# Patient Record
Sex: MALE | Employment: OTHER | URBAN - METROPOLITAN AREA
[De-identification: names, ages, dates, MRNs, and addresses within clinical notes are randomized per-mention and may not be internally consistent; named-entity substitution may affect disease eponyms.]

---

## 2018-02-12 ENCOUNTER — OFFICE VISIT (OUTPATIENT)
Dept: FAMILY MEDICINE CLINIC | Age: 58
End: 2018-02-12

## 2018-02-12 VITALS
BODY MASS INDEX: 29.99 KG/M2 | HEIGHT: 65 IN | SYSTOLIC BLOOD PRESSURE: 184 MMHG | OXYGEN SATURATION: 97 % | HEART RATE: 65 BPM | WEIGHT: 180 LBS | DIASTOLIC BLOOD PRESSURE: 100 MMHG

## 2018-02-12 DIAGNOSIS — R73.03 PREDIABETES: ICD-10-CM

## 2018-02-12 DIAGNOSIS — M25.50 ARTHRALGIA, UNSPECIFIED JOINT: ICD-10-CM

## 2018-02-12 DIAGNOSIS — Z87.438: ICD-10-CM

## 2018-02-12 DIAGNOSIS — I10 ESSENTIAL HYPERTENSION: ICD-10-CM

## 2018-02-12 DIAGNOSIS — N41.0 ACUTE PROSTATITIS: ICD-10-CM

## 2018-02-12 DIAGNOSIS — Z00.00 ENCOUNTER FOR MEDICAL EXAMINATION TO ESTABLISH CARE: Primary | ICD-10-CM

## 2018-02-12 LAB
BILIRUBIN, POC: NORMAL
BLOOD URINE, POC: NORMAL
CLARITY, POC: CLEAR
COLOR, POC: YELLOW
GLUCOSE BLD-MCNC: 98 MG/DL
GLUCOSE URINE, POC: NORMAL
HBA1C MFR BLD: 6.2 %
KETONES, POC: NORMAL
LEUKOCYTE EST, POC: NORMAL
NITRITE, POC: NORMAL
PH, POC: 5.5
PROTEIN, POC: NORMAL
SPECIFIC GRAVITY, POC: 1.02
UROBILINOGEN, POC: 0.2

## 2018-02-12 PROCEDURE — 82962 GLUCOSE BLOOD TEST: CPT | Performed by: NURSE PRACTITIONER

## 2018-02-12 PROCEDURE — 99386 PREV VISIT NEW AGE 40-64: CPT | Performed by: NURSE PRACTITIONER

## 2018-02-12 PROCEDURE — 83036 HEMOGLOBIN GLYCOSYLATED A1C: CPT | Performed by: NURSE PRACTITIONER

## 2018-02-12 PROCEDURE — 93000 ELECTROCARDIOGRAM COMPLETE: CPT | Performed by: NURSE PRACTITIONER

## 2018-02-12 PROCEDURE — 81002 URINALYSIS NONAUTO W/O SCOPE: CPT | Performed by: NURSE PRACTITIONER

## 2018-02-12 RX ORDER — LOSARTAN POTASSIUM AND HYDROCHLOROTHIAZIDE 25; 100 MG/1; MG/1
1 TABLET ORAL DAILY
Qty: 30 TABLET | Refills: 3 | Status: SHIPPED | OUTPATIENT
Start: 2018-02-12 | End: 2018-05-17 | Stop reason: SDUPTHER

## 2018-02-12 RX ORDER — SULFAMETHOXAZOLE AND TRIMETHOPRIM 800; 160 MG/1; MG/1
1 TABLET ORAL 2 TIMES DAILY
Qty: 20 TABLET | Refills: 0 | Status: SHIPPED | OUTPATIENT
Start: 2018-02-12 | End: 2018-02-22

## 2018-02-12 RX ORDER — PREDNISONE 20 MG/1
40 TABLET ORAL DAILY
Qty: 10 TABLET | Refills: 0 | Status: SHIPPED | OUTPATIENT
Start: 2018-02-12 | End: 2018-02-17

## 2018-02-12 RX ORDER — INDOMETHACIN 50 MG/1
50 CAPSULE ORAL
Qty: 30 CAPSULE | Refills: 0 | Status: SHIPPED | OUTPATIENT
Start: 2018-02-12 | End: 2018-05-17 | Stop reason: SDUPTHER

## 2018-02-12 ASSESSMENT — ENCOUNTER SYMPTOMS
COLOR CHANGE: 0
EYE DISCHARGE: 0
SORE THROAT: 0
EYE REDNESS: 0
SHORTNESS OF BREATH: 0
ABDOMINAL DISTENTION: 0
COUGH: 0
ABDOMINAL PAIN: 0

## 2018-02-12 ASSESSMENT — PATIENT HEALTH QUESTIONNAIRE - PHQ9
SUM OF ALL RESPONSES TO PHQ QUESTIONS 1-9: 0
2. FEELING DOWN, DEPRESSED OR HOPELESS: 0
SUM OF ALL RESPONSES TO PHQ9 QUESTIONS 1 & 2: 0
1. LITTLE INTEREST OR PLEASURE IN DOING THINGS: 0

## 2018-02-12 NOTE — PROGRESS NOTES
[facility]      There is no immunization history on file for this patient. No Known Allergies  Outpatient Prescriptions Marked as Taking for the 2/12/18 encounter (Office Visit) with Jose Bauman CNP   Medication Sig Dispense Refill    losartan-hydrochlorothiazide (HYZAAR) 100-25 MG per tablet Take 1 tablet by mouth daily 30 tablet 3    indomethacin (INDOCIN) 50 MG capsule Take 1 capsule by mouth 3 times daily (with meals) 30 capsule 0    predniSONE (DELTASONE) 20 MG tablet Take 2 tablets by mouth daily for 5 days 10 tablet 0    sulfamethoxazole-trimethoprim (BACTRIM DS) 800-160 MG per tablet Take 1 tablet by mouth 2 times daily for 10 days 20 tablet 0       Past Medical History:   Diagnosis Date    Gout     Hypertension 02/12/2018     History reviewed. No pertinent surgical history. History reviewed. No pertinent family history. Social History     Social History    Marital status: Single     Spouse name: N/A    Number of children: N/A    Years of education: N/A     Occupational History    Not on file. Social History Main Topics    Smoking status: Never Smoker    Smokeless tobacco: Never Used    Alcohol use No    Drug use: No    Sexual activity: Not on file     Other Topics Concern    Not on file     Social History Narrative    No narrative on file       Review of Systems   Constitutional: Negative for fatigue, fever and unexpected weight change. HENT: Negative for congestion, ear pain and sore throat. Eyes: Negative for discharge, redness and visual disturbance. Respiratory: Negative for cough and shortness of breath. Cardiovascular: Negative for leg swelling. Gastrointestinal: Negative for abdominal distention and abdominal pain. Genitourinary: Negative for frequency and urgency. Scrotal mass   Musculoskeletal: Positive for arthralgias. Skin: Negative for color change. Neurological: Negative for facial asymmetry and headaches.    Psychiatric/Behavioral: Positive

## 2018-02-12 NOTE — PATIENT INSTRUCTIONS
april aspirina no es adecuado para todo el TRENGEREID, debido a que puede causar sangrado grave. · Visite a ford médico periódicamente. Usted podría necesitar consultar a ford médico con más frecuencia al principio o hasta que se reduzca ford presión arterial.  · Si usted está tomando medicamentos para la presión arterial, hable con ford médico antes de april medicamentos descongestionantes o antiinflamatorios, ramila ibuprofeno. Algunos de estos medicamentos pueden elevar la presión arterial.  · Aprenda a revisarse la presión arterial en ford hogar. Cambios en el estilo de alex  · Mantenga un peso saludable. [place] es particularmente importante si aumenta de peso en la komal de la cintura. Bajar solo 10 libras (4.5 kg) puede ayudarle a reducir ford presión arterial.  · Serge más ejercicios si ford médico se lo recomienda. Caminar es nils buena opción. Poco a poco, aumente la distancia que [employer]. Intente hacer por lo menos 30 minutos de ejercicio la mayoría de los días de la [place]. También podría nadar, montar en bicicleta o hacer otras actividades. · No tome alcohol o limite la cantidad que ariana. Hable con ford médico acerca de si puede april alcohol. · Trate de limitar la cantidad de sodio que consume a menos de 2,300 miligramos (mg) al día. [place] médico podría pedirle que trate de consumir menos de 1,500 mg al día. · Coma abundantes frutas (ramila bananas [plátanos] y naranjas), verduras, legumbres, granos integrales y productos lácteos de bajo contenido de [place]. · Reduzca la cantidad de grasas saturadas en ford dieta. Los productos derivados de los [place], ramila la [place], el queso y la carne, contienen grasas saturadas. El limitar estos alimentos podría ayudarle a bajar de peso y [place] reducir ford riesgo de nils enfermedad cardíaca. · No fume. El hábito de fumar aumenta ford riesgo de ataque cerebral y ataque al corazón.  Si necesita ayuda para dejar de fumar, hable con ford médico sobre programas y medicamentos para dejar de pregunte a ford profesional de nasrin. [facility] niega toda garantía o responsabilidad por ford uso de esta información. Patient Education        Dieta DASH: Oziel Holes - [ DASH Diet: Care Instructions ]  Instrucciones de cuidado    La dieta DASH es un plan de alimentación que puede ayudar a bajar la presión arterial. DASH es la sigla en inglés de \"Dietary Approaches to Stop Hypertension\" (medidas dietéticas para disminuir la hipertensión). Hipertensión significa presión arterial vu. La dieta DASH se concentra en el consumo de alimentos con alto contenido de calcio, potasio y [place]. Estos nutrientes pueden disminuir la presión arterial. Los alimentos con el mayor contenido de [place] nutrientes son las frutas, las verduras, los productos lácteos de bajo contenido de [place], las nueces, las semillas y las legumbres. [place] april suplementos de calcio, potasio y magnesio, en lugar de comer alimentos ricos en estos nutrientes, no tiene el mismo efecto. La dieta DASH también incluye granos integrales, pescado y aves. La dieta DASH es julia de los cambios de estilo de alex que quizá le recomiende ford médico para reducir la presión arterial vu. Es posible que ford médico también quiera que usted reduzca la cantidad de sodio en ford Laurita Katina. Reducir el sodio mientras sigue la dieta DASH puede bajar la presión arterial incluso más que únicamente con la dieta DASH. La atención de seguimiento es nils parte clave de ford tratamiento y seguridad. Asegúrese de hacer y acudir a todas las citas, y llame a ford médico si está teniendo problemas. También es nils buena idea saber los resultados de artis exámenes y mantener nils lista de los medicamentos que ting. ¿Cómo puede cuidarse en el hogar? Cómo seguir la dieta DASH  · Coma entre 4 y 5 porciones de fruta al día.  Nils porción incluye 1 fruta de [place], ½ taza de fruta enlatada o cortada en trozos, 1/4 taza de fruta seca o 4 onzas (½ taza) de [place] de

## 2018-02-13 PROBLEM — M25.50 ARTHRALGIA: Status: ACTIVE | Noted: 2018-02-13

## 2018-02-13 PROBLEM — N41.0 ACUTE PROSTATITIS: Status: ACTIVE | Noted: 2018-02-13

## 2018-02-13 PROBLEM — Z87.438: Status: ACTIVE | Noted: 2018-02-13

## 2018-02-13 PROBLEM — R73.03 PREDIABETES: Status: ACTIVE | Noted: 2018-02-13

## 2018-02-13 PROBLEM — I10 ESSENTIAL HYPERTENSION: Status: ACTIVE | Noted: 2018-02-13

## 2018-02-14 LAB — URINE CULTURE, ROUTINE: NORMAL

## 2018-02-23 DIAGNOSIS — M25.50 ARTHRALGIA, UNSPECIFIED JOINT: ICD-10-CM

## 2018-02-23 DIAGNOSIS — Z00.00 ENCOUNTER FOR MEDICAL EXAMINATION TO ESTABLISH CARE: ICD-10-CM

## 2018-02-23 LAB
A/G RATIO: 1.8 (ref 1.1–2.2)
ALBUMIN SERPL-MCNC: 5 G/DL (ref 3.4–5)
ALP BLD-CCNC: 206 U/L (ref 40–129)
ALT SERPL-CCNC: 48 U/L (ref 10–40)
ANION GAP SERPL CALCULATED.3IONS-SCNC: 16 MMOL/L (ref 3–16)
AST SERPL-CCNC: 23 U/L (ref 15–37)
BASOPHILS ABSOLUTE: 0.1 K/UL (ref 0–0.2)
BASOPHILS RELATIVE PERCENT: 0.3 %
BILIRUB SERPL-MCNC: 0.6 MG/DL (ref 0–1)
BILIRUBIN DIRECT: <0.2 MG/DL (ref 0–0.3)
BILIRUBIN, INDIRECT: ABNORMAL MG/DL (ref 0–1)
BUN BLDV-MCNC: 23 MG/DL (ref 7–20)
CALCIUM SERPL-MCNC: 9.5 MG/DL (ref 8.3–10.6)
CHLORIDE BLD-SCNC: 98 MMOL/L (ref 99–110)
CHOLESTEROL, TOTAL: 329 MG/DL (ref 0–199)
CO2: 23 MMOL/L (ref 21–32)
CREAT SERPL-MCNC: 1.2 MG/DL (ref 0.9–1.3)
EOSINOPHILS ABSOLUTE: 0 K/UL (ref 0–0.6)
EOSINOPHILS RELATIVE PERCENT: 0.2 %
GFR AFRICAN AMERICAN: >60
GFR NON-AFRICAN AMERICAN: >60
GLOBULIN: 2.8 G/DL
GLUCOSE BLD-MCNC: 101 MG/DL (ref 70–99)
HCT VFR BLD CALC: 49 % (ref 40.5–52.5)
HDLC SERPL-MCNC: 57 MG/DL (ref 40–60)
HEMOGLOBIN: 16.3 G/DL (ref 13.5–17.5)
LDL CHOLESTEROL CALCULATED: 229 MG/DL
LIPASE: 25 U/L (ref 13–60)
LYMPHOCYTES ABSOLUTE: 3.4 K/UL (ref 1–5.1)
LYMPHOCYTES RELATIVE PERCENT: 21.5 %
MCH RBC QN AUTO: 31 PG (ref 26–34)
MCHC RBC AUTO-ENTMCNC: 33.4 G/DL (ref 31–36)
MCV RBC AUTO: 93 FL (ref 80–100)
MONOCYTES ABSOLUTE: 0.7 K/UL (ref 0–1.3)
MONOCYTES RELATIVE PERCENT: 4.8 %
NEUTROPHILS ABSOLUTE: 11.5 K/UL (ref 1.7–7.7)
NEUTROPHILS RELATIVE PERCENT: 73.2 %
PDW BLD-RTO: 13.7 % (ref 12.4–15.4)
PLATELET # BLD: 305 K/UL (ref 135–450)
PMV BLD AUTO: 10.5 FL (ref 5–10.5)
POTASSIUM SERPL-SCNC: 4.8 MMOL/L (ref 3.5–5.1)
PROSTATE SPECIFIC ANTIGEN: 0.54 NG/ML (ref 0–4)
RBC # BLD: 5.27 M/UL (ref 4.2–5.9)
RHEUMATOID FACTOR: <10 IU/ML
SEDIMENTATION RATE, ERYTHROCYTE: 9 MM/HR (ref 0–20)
SODIUM BLD-SCNC: 137 MMOL/L (ref 136–145)
TOTAL PROTEIN: 7.8 G/DL (ref 6.4–8.2)
TRIGL SERPL-MCNC: 215 MG/DL (ref 0–150)
TSH REFLEX: 1.47 UIU/ML (ref 0.27–4.2)
VLDLC SERPL CALC-MCNC: 43 MG/DL
WBC # BLD: 15.7 K/UL (ref 4–11)

## 2018-02-26 ENCOUNTER — OFFICE VISIT (OUTPATIENT)
Dept: FAMILY MEDICINE CLINIC | Age: 58
End: 2018-02-26

## 2018-02-26 VITALS
SYSTOLIC BLOOD PRESSURE: 152 MMHG | DIASTOLIC BLOOD PRESSURE: 94 MMHG | RESPIRATION RATE: 16 BRPM | HEART RATE: 67 BPM | BODY MASS INDEX: 30.66 KG/M2 | OXYGEN SATURATION: 97 % | WEIGHT: 184 LBS | HEIGHT: 65 IN

## 2018-02-26 DIAGNOSIS — E78.49 OTHER HYPERLIPIDEMIA: ICD-10-CM

## 2018-02-26 DIAGNOSIS — I10 ESSENTIAL HYPERTENSION: Primary | ICD-10-CM

## 2018-02-26 PROCEDURE — G8417 CALC BMI ABV UP PARAM F/U: HCPCS | Performed by: NURSE PRACTITIONER

## 2018-02-26 PROCEDURE — G8427 DOCREV CUR MEDS BY ELIG CLIN: HCPCS | Performed by: NURSE PRACTITIONER

## 2018-02-26 PROCEDURE — 3017F COLORECTAL CA SCREEN DOC REV: CPT | Performed by: NURSE PRACTITIONER

## 2018-02-26 PROCEDURE — G8484 FLU IMMUNIZE NO ADMIN: HCPCS | Performed by: NURSE PRACTITIONER

## 2018-02-26 PROCEDURE — 1036F TOBACCO NON-USER: CPT | Performed by: NURSE PRACTITIONER

## 2018-02-26 PROCEDURE — 99213 OFFICE O/P EST LOW 20 MIN: CPT | Performed by: NURSE PRACTITIONER

## 2018-02-26 RX ORDER — LISINOPRIL 10 MG/1
10 TABLET ORAL DAILY
Qty: 30 TABLET | Refills: 0 | Status: SHIPPED | OUTPATIENT
Start: 2018-02-26 | End: 2018-05-17 | Stop reason: SDUPTHER

## 2018-02-26 RX ORDER — CHLORAL HYDRATE 500 MG
1 CAPSULE ORAL DAILY
Qty: 90 CAPSULE | Refills: 2 | Status: SHIPPED | OUTPATIENT
Start: 2018-02-26 | End: 2019-02-26 | Stop reason: SDUPTHER

## 2018-02-26 NOTE — PATIENT INSTRUCTIONS
saludables, no fumar, adelgazar y [address] · Es posible que tenga que april medicamentos. La atención de seguimiento es nils parte clave de ford tratamiento y seguridad. Asegúrese de hacer y acudir a todas las citas, y llame a ford médico si está teniendo problemas. También es nils buena idea saber los resultados de artis exámenes y mantener nils lista de los medicamentos que tign. ¿Dónde puede encontrar más información en inglés? Haile Rivas a [email or web address] e ingrese a ford cuenta de MyChart. Emily Boys H554 en el cuadro \"Search Health Information\" para más información (en inglés) sobre \"Aprenda sobre el colesterol alto - [ Learning About High Cholesterol ]. \"     Si no tiene nils cuenta, lisbet maribeth en el enlace \"Sign Up Now\". Revisado: 21 septiembre, 2016  Versión del contenido: 11.5  © 1538-1519 Healthwise, Incorporated. Las instrucciones de cuidado fueron adaptadas bajo licencia por [facility] ([facility]). Si usted tiene [place] afección médica o sobre estas instrucciones, siempre pregunte a ford profesional de nasrin. Healthwise, Incorporated niega toda garantía o responsabilidad por ford uso de esta información. Patient Education        Prediabetes: Instrucciones de cuidado - [ Prediabetes: Care Instructions ]  Instrucciones de cuidado  La prediabetes es nils señal de advertencia de que usted está en riesgo de llegar a tener diabetes tipo 2. [place] significa que ford nivel de azúcar en la vikram es más alto de lo que debiera ser. Los alimentos que usted come se convierten en azúcar, la cual ford cuerpo [place] para obtener energía. Normalmente, un órgano llamado páncreas produce insulina, la cual permite que el azúcar en la vikram llegue a las células del cuerpo. [place] cuando el cuerpo no puede utilizar la [employer], el azúcar no entra en las células. En cambio, se queda en [place] [employer]. [place] se conoce ramila resistencia a la insulina.  La acumulación de azúcar en la vikram causa

## 2018-02-26 NOTE — PROGRESS NOTES
History   Smoking Status    Never Smoker   Smokeless Tobacco    Never Used     Jb Grass 63 y.o. male,    PMH, surgeries, SH, FH, allergies reviewed. Medication list reviewed and updated. Chief Complaint   Patient presents with    Discuss Labs    Hyperlipidemia    Hypertension     Hypertension: Patient here for follow-up of elevated blood pressure. He is exercising and is adherent to low salt diet. Blood pressure is not well controlled at home. Cardiac symptoms none. Patient denies chest pain, chest pressure/discomfort, claudication, syncope and tachypnea. Cardiovascular risk factors: advanced age (older than 54 for men, 72 for women), diabetes mellitus, dyslipidemia, hypertension and male gender. Use of agents associated with hypertension: NSAIDS. History of target organ damage: none. Objective:   VS reviewed    Vitals:    02/26/18 1313 02/26/18 1322   BP: (!) 158/98 (!) 152/94   Site: Left Arm    Position: Sitting    Cuff Size: Medium Adult    Pulse: 67    Resp: 16    SpO2: 97%    Weight: 184 lb (83.5 kg)    Height: 5' 5\" (1.651 m)      Body mass index is 30.62 kg/m². Wt Readings from Last 3 Encounters:   02/26/18 184 lb (83.5 kg)   02/12/18 180 lb (81.6 kg)   10/10/17 180 lb (81.6 kg)     BP Readings from Last 3 Encounters:   02/26/18 (!) 152/94   02/12/18 (!) 184/100   10/10/17 (!) 158/102       Physical Exam   Constitutional: He is oriented to person, place, and time and well-developed, well-nourished, and in no distress. HENT:   Head: Normocephalic and atraumatic. Eyes: Pupils are equal, round, and reactive to light. Neck: Normal range of motion. Neck supple. Cardiovascular: Normal rate. Pulmonary/Chest: Breath sounds normal. No respiratory distress. Abdominal: Soft. He exhibits no distension. There is no tenderness. Genitourinary: Penis normal. No discharge found. Musculoskeletal: Normal range of motion. He exhibits no edema or tenderness.    Neurological: He is alert and

## 2018-02-27 PROBLEM — E78.49 OTHER HYPERLIPIDEMIA: Status: ACTIVE | Noted: 2018-02-27

## 2018-03-16 ENCOUNTER — OFFICE VISIT (OUTPATIENT)
Dept: FAMILY MEDICINE CLINIC | Age: 58
End: 2018-03-16

## 2018-03-16 DIAGNOSIS — I10 ESSENTIAL HYPERTENSION: Primary | ICD-10-CM

## 2018-05-17 ENCOUNTER — OFFICE VISIT (OUTPATIENT)
Dept: FAMILY MEDICINE CLINIC | Age: 58
End: 2018-05-17

## 2018-05-17 VITALS
OXYGEN SATURATION: 99 % | WEIGHT: 186 LBS | DIASTOLIC BLOOD PRESSURE: 90 MMHG | SYSTOLIC BLOOD PRESSURE: 140 MMHG | HEART RATE: 60 BPM | BODY MASS INDEX: 30.95 KG/M2 | TEMPERATURE: 98 F

## 2018-05-17 DIAGNOSIS — M25.50 ARTHRALGIA, UNSPECIFIED JOINT: ICD-10-CM

## 2018-05-17 DIAGNOSIS — I10 ESSENTIAL HYPERTENSION: ICD-10-CM

## 2018-05-17 DIAGNOSIS — M10.9 GOUT, ARTHRITIS: Primary | ICD-10-CM

## 2018-05-17 DIAGNOSIS — R73.03 PREDIABETES: ICD-10-CM

## 2018-05-17 LAB
A/G RATIO: 1.6 (ref 1.1–2.2)
ALBUMIN SERPL-MCNC: 4.7 G/DL (ref 3.4–5)
ALP BLD-CCNC: 166 U/L (ref 40–129)
ALT SERPL-CCNC: 62 U/L (ref 10–40)
ANION GAP SERPL CALCULATED.3IONS-SCNC: 21 MMOL/L (ref 3–16)
AST SERPL-CCNC: 34 U/L (ref 15–37)
BILIRUB SERPL-MCNC: 0.4 MG/DL (ref 0–1)
BUN BLDV-MCNC: 15 MG/DL (ref 7–20)
CALCIUM SERPL-MCNC: 9.3 MG/DL (ref 8.3–10.6)
CHLORIDE BLD-SCNC: 103 MMOL/L (ref 99–110)
CO2: 20 MMOL/L (ref 21–32)
CREAT SERPL-MCNC: 0.9 MG/DL (ref 0.9–1.3)
CREATININE URINE POCT: NORMAL
GFR AFRICAN AMERICAN: >60
GFR NON-AFRICAN AMERICAN: >60
GLOBULIN: 3 G/DL
GLUCOSE BLD-MCNC: 78 MG/DL (ref 70–99)
HBA1C MFR BLD: 6.1 %
MICROALBUMIN/CREAT 24H UR: NORMAL MG/G{CREAT}
MICROALBUMIN/CREAT UR-RTO: NORMAL
POTASSIUM SERPL-SCNC: 4.3 MMOL/L (ref 3.5–5.1)
SODIUM BLD-SCNC: 144 MMOL/L (ref 136–145)
TOTAL PROTEIN: 7.7 G/DL (ref 6.4–8.2)
URIC ACID, SERUM: 8.9 MG/DL (ref 3.5–7.2)

## 2018-05-17 PROCEDURE — 3017F COLORECTAL CA SCREEN DOC REV: CPT | Performed by: NURSE PRACTITIONER

## 2018-05-17 PROCEDURE — G8427 DOCREV CUR MEDS BY ELIG CLIN: HCPCS | Performed by: NURSE PRACTITIONER

## 2018-05-17 PROCEDURE — 83036 HEMOGLOBIN GLYCOSYLATED A1C: CPT | Performed by: NURSE PRACTITIONER

## 2018-05-17 PROCEDURE — 82044 UR ALBUMIN SEMIQUANTITATIVE: CPT | Performed by: NURSE PRACTITIONER

## 2018-05-17 PROCEDURE — G8417 CALC BMI ABV UP PARAM F/U: HCPCS | Performed by: NURSE PRACTITIONER

## 2018-05-17 PROCEDURE — 36415 COLL VENOUS BLD VENIPUNCTURE: CPT | Performed by: NURSE PRACTITIONER

## 2018-05-17 PROCEDURE — 99214 OFFICE O/P EST MOD 30 MIN: CPT | Performed by: NURSE PRACTITIONER

## 2018-05-17 PROCEDURE — 1036F TOBACCO NON-USER: CPT | Performed by: NURSE PRACTITIONER

## 2018-05-17 RX ORDER — INDOMETHACIN 50 MG/1
50 CAPSULE ORAL
Qty: 30 CAPSULE | Refills: 0 | Status: SHIPPED | OUTPATIENT
Start: 2018-05-17 | End: 2018-07-26 | Stop reason: SDUPTHER

## 2018-05-17 RX ORDER — PREDNISONE 20 MG/1
40 TABLET ORAL DAILY
Qty: 14 TABLET | Refills: 0 | Status: SHIPPED | OUTPATIENT
Start: 2018-05-17 | End: 2018-05-24

## 2018-05-17 RX ORDER — LOSARTAN POTASSIUM AND HYDROCHLOROTHIAZIDE 25; 100 MG/1; MG/1
1 TABLET ORAL DAILY
Qty: 30 TABLET | Refills: 3 | Status: SHIPPED | OUTPATIENT
Start: 2018-05-17 | End: 2018-07-26 | Stop reason: SINTOL

## 2018-05-17 RX ORDER — LISINOPRIL 10 MG/1
10 TABLET ORAL DAILY
Qty: 30 TABLET | Refills: 3 | Status: SHIPPED | OUTPATIENT
Start: 2018-05-17 | End: 2018-07-26 | Stop reason: DRUGHIGH

## 2018-05-29 ASSESSMENT — ENCOUNTER SYMPTOMS
SORE THROAT: 0
COUGH: 0

## 2018-07-26 ENCOUNTER — OFFICE VISIT (OUTPATIENT)
Dept: FAMILY MEDICINE CLINIC | Age: 58
End: 2018-07-26

## 2018-07-26 VITALS
BODY MASS INDEX: 29.99 KG/M2 | SYSTOLIC BLOOD PRESSURE: 142 MMHG | WEIGHT: 180.2 LBS | HEART RATE: 58 BPM | OXYGEN SATURATION: 98 % | TEMPERATURE: 98 F | DIASTOLIC BLOOD PRESSURE: 92 MMHG

## 2018-07-26 DIAGNOSIS — M10.9 GOUT, ARTHRITIS: ICD-10-CM

## 2018-07-26 DIAGNOSIS — I10 ESSENTIAL HYPERTENSION: ICD-10-CM

## 2018-07-26 PROCEDURE — G8417 CALC BMI ABV UP PARAM F/U: HCPCS | Performed by: NURSE PRACTITIONER

## 2018-07-26 PROCEDURE — 3017F COLORECTAL CA SCREEN DOC REV: CPT | Performed by: NURSE PRACTITIONER

## 2018-07-26 PROCEDURE — G8427 DOCREV CUR MEDS BY ELIG CLIN: HCPCS | Performed by: NURSE PRACTITIONER

## 2018-07-26 PROCEDURE — 99214 OFFICE O/P EST MOD 30 MIN: CPT | Performed by: NURSE PRACTITIONER

## 2018-07-26 PROCEDURE — 1036F TOBACCO NON-USER: CPT | Performed by: NURSE PRACTITIONER

## 2018-07-26 RX ORDER — LISINOPRIL 20 MG/1
20 TABLET ORAL DAILY
Qty: 30 TABLET | Refills: 2 | Status: SHIPPED | OUTPATIENT
Start: 2018-07-26 | End: 2019-02-26 | Stop reason: SDUPTHER

## 2018-07-26 RX ORDER — INDOMETHACIN 50 MG/1
50 CAPSULE ORAL
Qty: 90 CAPSULE | Refills: 1 | Status: SHIPPED | OUTPATIENT
Start: 2018-07-26 | End: 2020-04-01

## 2018-07-26 RX ORDER — ALLOPURINOL 100 MG/1
200 TABLET ORAL DAILY
Qty: 30 TABLET | Refills: 3 | Status: SHIPPED | OUTPATIENT
Start: 2018-07-26 | End: 2019-02-26 | Stop reason: SDUPTHER

## 2018-07-26 NOTE — PROGRESS NOTES
History   Smoking Status    Never Smoker   Smokeless Tobacco    Never Used     Wyvonnia Holiday 63 y.o. male,      Chief Complaint   Patient presents with    Gout     follow up        Hypertension: Patient here for follow-up of elevated blood pressure. He is exercising and is adherent to low salt diet. Blood pressure is not well controlled at home. Cardiac symptoms none. Patient denies chest pain, chest pressure/discomfort, claudication, dyspnea, orthopnea, palpitations, syncope and tachypnea. Cardiovascular risk factors: advanced age (older than 54 for men, 72 for women), diabetes mellitus, hypertension and male gender. Use of agents associated with hypertension: none. History of target organ damage: none. PMH, SH, FH, allergies reviewed and updated. Medication list reviewed and updated. No Known Allergies      Vitals:    07/26/18 1327   BP: (!) 142/92   Site: Left Arm   Position: Sitting   Cuff Size: Medium Adult   Pulse: 58   Temp: 98 °F (36.7 °C)   TempSrc: Oral   SpO2: 98%   Weight: 180 lb 3.2 oz (81.7 kg)     Body mass index is 29.99 kg/m². Physical Exam   Constitutional: He is oriented to person, place, and time and well-developed, well-nourished, and in no distress. HENT:   Head: Normocephalic. Eyes: Pupils are equal, round, and reactive to light. Neck: Normal range of motion. Neck supple. Cardiovascular: Normal rate and regular rhythm. Pulmonary/Chest: Effort normal and breath sounds normal.   Abdominal: Soft. Bowel sounds are normal.   Musculoskeletal: Normal range of motion. Neurological: He is alert and oriented to person, place, and time. Skin: Skin is warm and dry. ASSESSMENT/PLAN  Stop taking Losartan-Hydrochlorthiazide  Start taking 20mg daily of Lisinopril  Start taking Allopurinol daily to prevent gout attacks  Take Indomethacin as needed for pain     1. Essential hypertension    2.  Gout, arthritis        Essential hypertension  -     lisinopril (PRINIVIL;ZESTRIL) 20 MG tablet; Take 1 tablet by mouth daily  -     allopurinol (ZYLOPRIM) 100 MG tablet; Take 2 tablets by mouth daily    Gout, arthritis  -     indomethacin (INDOCIN) 50 MG capsule; Take 1 capsule by mouth 3 times daily (with meals)      Return in about 2 months (around 9/26/2018) for Gout w/  . No orders of the defined types were placed in this encounter. Current Outpatient Prescriptions   Medication Sig Dispense Refill    lisinopril (PRINIVIL;ZESTRIL) 20 MG tablet Take 1 tablet by mouth daily 30 tablet 2    allopurinol (ZYLOPRIM) 100 MG tablet Take 2 tablets by mouth daily 30 tablet 3    indomethacin (INDOCIN) 50 MG capsule Take 1 capsule by mouth 3 times daily (with meals) 90 capsule 1    Omega-3 Fatty Acids (OMEGA-3 FISH OIL) 1000 MG CAPS Take 1 capsule by mouth daily 90 capsule 2    colchicine (COLCRYS) 0.6 MG tablet One tablet per hour or every two hours until relief of gouty pain and inflammation, up to a total of 4 mg or until upset stomach occurs 30 tablet 0     No current facility-administered medications for this visit.

## 2019-02-26 ENCOUNTER — OFFICE VISIT (OUTPATIENT)
Dept: FAMILY MEDICINE CLINIC | Age: 59
End: 2019-02-26

## 2019-02-26 VITALS
HEART RATE: 52 BPM | OXYGEN SATURATION: 98 % | BODY MASS INDEX: 31.71 KG/M2 | RESPIRATION RATE: 16 BRPM | SYSTOLIC BLOOD PRESSURE: 142 MMHG | TEMPERATURE: 98.2 F | DIASTOLIC BLOOD PRESSURE: 98 MMHG | HEIGHT: 63 IN | WEIGHT: 179 LBS

## 2019-02-26 DIAGNOSIS — E78.49 OTHER HYPERLIPIDEMIA: ICD-10-CM

## 2019-02-26 DIAGNOSIS — I10 ESSENTIAL HYPERTENSION: ICD-10-CM

## 2019-02-26 DIAGNOSIS — M25.511 CHRONIC RIGHT SHOULDER PAIN: Primary | ICD-10-CM

## 2019-02-26 DIAGNOSIS — M10.9 GOUT, ARTHRITIS: ICD-10-CM

## 2019-02-26 DIAGNOSIS — R73.03 PREDIABETES: ICD-10-CM

## 2019-02-26 DIAGNOSIS — G89.29 CHRONIC RIGHT SHOULDER PAIN: Primary | ICD-10-CM

## 2019-02-26 DIAGNOSIS — Z12.5 PROSTATE CANCER SCREENING: ICD-10-CM

## 2019-02-26 DIAGNOSIS — N50.89 TESTICLE LUMP: ICD-10-CM

## 2019-02-26 LAB
A/G RATIO: 1.6 (ref 1.1–2.2)
ALBUMIN SERPL-MCNC: 4.7 G/DL (ref 3.4–5)
ALP BLD-CCNC: 171 U/L (ref 40–129)
ALT SERPL-CCNC: 50 U/L (ref 10–40)
ANION GAP SERPL CALCULATED.3IONS-SCNC: 17 MMOL/L (ref 3–16)
AST SERPL-CCNC: 26 U/L (ref 15–37)
BASOPHILS ABSOLUTE: 0.1 K/UL (ref 0–0.2)
BASOPHILS RELATIVE PERCENT: 0.5 %
BILIRUB SERPL-MCNC: <0.2 MG/DL (ref 0–1)
BUN BLDV-MCNC: 27 MG/DL (ref 7–20)
CALCIUM SERPL-MCNC: 10.3 MG/DL (ref 8.3–10.6)
CHLORIDE BLD-SCNC: 101 MMOL/L (ref 99–110)
CHOLESTEROL, TOTAL: 308 MG/DL (ref 0–199)
CO2: 24 MMOL/L (ref 21–32)
CREAT SERPL-MCNC: 1.1 MG/DL (ref 0.9–1.3)
EOSINOPHILS ABSOLUTE: 0.1 K/UL (ref 0–0.6)
EOSINOPHILS RELATIVE PERCENT: 0.8 %
GFR AFRICAN AMERICAN: >60
GFR NON-AFRICAN AMERICAN: >60
GLOBULIN: 3 G/DL
GLUCOSE BLD-MCNC: 100 MG/DL (ref 70–99)
HCT VFR BLD CALC: 48.1 % (ref 40.5–52.5)
HDLC SERPL-MCNC: 47 MG/DL (ref 40–60)
HEMOGLOBIN: 15.6 G/DL (ref 13.5–17.5)
LDL CHOLESTEROL CALCULATED: 202 MG/DL
LYMPHOCYTES ABSOLUTE: 4.1 K/UL (ref 1–5.1)
LYMPHOCYTES RELATIVE PERCENT: 33.8 %
MCH RBC QN AUTO: 31 PG (ref 26–34)
MCHC RBC AUTO-ENTMCNC: 32.4 G/DL (ref 31–36)
MCV RBC AUTO: 95.7 FL (ref 80–100)
MONOCYTES ABSOLUTE: 1 K/UL (ref 0–1.3)
MONOCYTES RELATIVE PERCENT: 8.2 %
NEUTROPHILS ABSOLUTE: 6.8 K/UL (ref 1.7–7.7)
NEUTROPHILS RELATIVE PERCENT: 56.7 %
PDW BLD-RTO: 13.7 % (ref 12.4–15.4)
PLATELET # BLD: 273 K/UL (ref 135–450)
PMV BLD AUTO: 10.9 FL (ref 5–10.5)
POTASSIUM SERPL-SCNC: 4.3 MMOL/L (ref 3.5–5.1)
PROSTATE SPECIFIC ANTIGEN: 0.9 NG/ML (ref 0–4)
RBC # BLD: 5.03 M/UL (ref 4.2–5.9)
SODIUM BLD-SCNC: 142 MMOL/L (ref 136–145)
TOTAL PROTEIN: 7.7 G/DL (ref 6.4–8.2)
TRIGL SERPL-MCNC: 296 MG/DL (ref 0–150)
TSH REFLEX: 2.76 UIU/ML (ref 0.27–4.2)
URIC ACID, SERUM: 9.1 MG/DL (ref 3.5–7.2)
VLDLC SERPL CALC-MCNC: 59 MG/DL
WBC # BLD: 12 K/UL (ref 4–11)

## 2019-02-26 PROCEDURE — 99214 OFFICE O/P EST MOD 30 MIN: CPT | Performed by: NURSE PRACTITIONER

## 2019-02-26 PROCEDURE — 36415 COLL VENOUS BLD VENIPUNCTURE: CPT | Performed by: NURSE PRACTITIONER

## 2019-02-26 RX ORDER — MELOXICAM 7.5 MG/1
7.5 TABLET ORAL DAILY
Qty: 60 TABLET | Refills: 1 | Status: SHIPPED | OUTPATIENT
Start: 2019-02-26 | End: 2020-04-01

## 2019-02-26 RX ORDER — LISINOPRIL 20 MG/1
20 TABLET ORAL DAILY
Qty: 60 TABLET | Refills: 1 | Status: ON HOLD | OUTPATIENT
Start: 2019-02-26 | End: 2021-02-12 | Stop reason: SDUPTHER

## 2019-02-26 RX ORDER — CHLORAL HYDRATE 500 MG
1 CAPSULE ORAL DAILY
Qty: 90 CAPSULE | Refills: 2 | Status: SHIPPED | OUTPATIENT
Start: 2019-02-26

## 2019-02-26 RX ORDER — ALLOPURINOL 100 MG/1
200 TABLET ORAL DAILY
Qty: 30 TABLET | Refills: 3 | Status: SHIPPED | OUTPATIENT
Start: 2019-02-26

## 2019-02-26 RX ORDER — SULFAMETHOXAZOLE AND TRIMETHOPRIM 800; 160 MG/1; MG/1
1 TABLET ORAL 2 TIMES DAILY
Qty: 20 TABLET | Refills: 0 | Status: SHIPPED | OUTPATIENT
Start: 2019-02-26 | End: 2019-03-08

## 2019-02-27 LAB
ESTIMATED AVERAGE GLUCOSE: 139.9 MG/DL
HBA1C MFR BLD: 6.5 %

## 2019-03-11 ENCOUNTER — TELEPHONE (OUTPATIENT)
Dept: FAMILY MEDICINE CLINIC | Age: 59
End: 2019-03-11

## 2019-03-12 DIAGNOSIS — E78.49 OTHER HYPERLIPIDEMIA: Primary | ICD-10-CM

## 2019-03-12 DIAGNOSIS — R73.03 PREDIABETES: ICD-10-CM

## 2019-03-12 RX ORDER — ATORVASTATIN CALCIUM 20 MG/1
20 TABLET, FILM COATED ORAL DAILY
Qty: 30 TABLET | Refills: 2 | Status: SHIPPED | OUTPATIENT
Start: 2019-03-12

## 2019-03-14 ENCOUNTER — OFFICE VISIT (OUTPATIENT)
Dept: FAMILY MEDICINE CLINIC | Age: 59
End: 2019-03-14

## 2019-03-14 VITALS
HEART RATE: 63 BPM | OXYGEN SATURATION: 97 % | SYSTOLIC BLOOD PRESSURE: 100 MMHG | WEIGHT: 163.4 LBS | DIASTOLIC BLOOD PRESSURE: 72 MMHG | RESPIRATION RATE: 15 BRPM | BODY MASS INDEX: 28.95 KG/M2 | TEMPERATURE: 97.8 F

## 2019-03-14 DIAGNOSIS — N34.2 URETHRITIS: Primary | ICD-10-CM

## 2019-03-14 DIAGNOSIS — E11.9 NEWLY DIAGNOSED DIABETES (HCC): ICD-10-CM

## 2019-03-14 LAB
BILIRUBIN, POC: NORMAL
BLOOD URINE, POC: NORMAL
CLARITY, POC: CLEAR
COLOR, POC: NORMAL
GLUCOSE URINE, POC: NORMAL
KETONES, POC: NORMAL
LEUKOCYTE EST, POC: NORMAL
NITRITE, POC: NORMAL
PH, POC: 5.5
PROTEIN, POC: NORMAL
SPECIFIC GRAVITY, POC: 1.03
UROBILINOGEN, POC: 0.2

## 2019-03-14 PROCEDURE — 99213 OFFICE O/P EST LOW 20 MIN: CPT | Performed by: NURSE PRACTITIONER

## 2019-03-14 PROCEDURE — 81002 URINALYSIS NONAUTO W/O SCOPE: CPT | Performed by: NURSE PRACTITIONER

## 2019-03-14 RX ORDER — CIPROFLOXACIN 500 MG/1
500 TABLET, FILM COATED ORAL 2 TIMES DAILY
Qty: 20 TABLET | Refills: 0 | Status: SHIPPED | OUTPATIENT
Start: 2019-03-14 | End: 2019-03-24

## 2019-03-15 ENCOUNTER — HOSPITAL ENCOUNTER (OUTPATIENT)
Dept: ULTRASOUND IMAGING | Age: 59
Discharge: HOME | End: 2019-03-15

## 2019-03-15 DIAGNOSIS — N50.3 CYST OF EPIDIDYMIS DETERMINED BY ULTRASOUND: Primary | ICD-10-CM

## 2019-03-15 DIAGNOSIS — N43.3 HYDROCELE IN ADULT: ICD-10-CM

## 2019-03-15 DIAGNOSIS — N50.89 TESTICLE LUMP: ICD-10-CM

## 2019-03-15 PROCEDURE — 76870 US EXAM SCROTUM: CPT

## 2019-03-18 PROBLEM — E11.9 NEWLY DIAGNOSED DIABETES (HCC): Status: ACTIVE | Noted: 2019-03-18

## 2020-04-01 ENCOUNTER — HOSPITAL ENCOUNTER (EMERGENCY)
Age: 60
Discharge: HOME | End: 2020-04-01
Payer: MEDICARE

## 2020-04-01 ENCOUNTER — APPOINTMENT (OUTPATIENT)
Dept: GENERAL RADIOLOGY | Age: 60
End: 2020-04-01
Payer: MEDICARE

## 2020-04-01 VITALS
WEIGHT: 167.11 LBS | HEART RATE: 68 BPM | SYSTOLIC BLOOD PRESSURE: 197 MMHG | TEMPERATURE: 97.7 F | DIASTOLIC BLOOD PRESSURE: 89 MMHG | BODY MASS INDEX: 29.6 KG/M2 | RESPIRATION RATE: 18 BRPM | OXYGEN SATURATION: 100 %

## 2020-04-01 LAB
A/G RATIO: 1.3 (ref 1.1–2.2)
ALBUMIN SERPL-MCNC: 4.4 G/DL (ref 3.4–5)
ALP BLD-CCNC: 169 U/L (ref 40–129)
ALT SERPL-CCNC: 56 U/L (ref 10–40)
ANION GAP SERPL CALCULATED.3IONS-SCNC: 17 MMOL/L (ref 3–16)
AST SERPL-CCNC: 46 U/L (ref 15–37)
BASOPHILS ABSOLUTE: 0.1 K/UL (ref 0–0.2)
BASOPHILS RELATIVE PERCENT: 0.7 %
BILIRUB SERPL-MCNC: 0.3 MG/DL (ref 0–1)
BUN BLDV-MCNC: 24 MG/DL (ref 7–20)
CALCIUM SERPL-MCNC: 9.6 MG/DL (ref 8.3–10.6)
CHLORIDE BLD-SCNC: 101 MMOL/L (ref 99–110)
CO2: 19 MMOL/L (ref 21–32)
CREAT SERPL-MCNC: 0.9 MG/DL (ref 0.9–1.3)
EOSINOPHILS ABSOLUTE: 0 K/UL (ref 0–0.6)
EOSINOPHILS RELATIVE PERCENT: 0.3 %
GFR AFRICAN AMERICAN: >60
GFR NON-AFRICAN AMERICAN: >60
GLOBULIN: 3.5 G/DL
GLUCOSE BLD-MCNC: 139 MG/DL (ref 70–99)
HCT VFR BLD CALC: 44.8 % (ref 40.5–52.5)
HEMOGLOBIN: 14.9 G/DL (ref 13.5–17.5)
LYMPHOCYTES ABSOLUTE: 4.8 K/UL (ref 1–5.1)
LYMPHOCYTES RELATIVE PERCENT: 31.8 %
MCH RBC QN AUTO: 30.4 PG (ref 26–34)
MCHC RBC AUTO-ENTMCNC: 33.2 G/DL (ref 31–36)
MCV RBC AUTO: 91.7 FL (ref 80–100)
MONOCYTES ABSOLUTE: 1.3 K/UL (ref 0–1.3)
MONOCYTES RELATIVE PERCENT: 8.3 %
NEUTROPHILS ABSOLUTE: 8.9 K/UL (ref 1.7–7.7)
NEUTROPHILS RELATIVE PERCENT: 58.9 %
PDW BLD-RTO: 13.7 % (ref 12.4–15.4)
PLATELET # BLD: 218 K/UL (ref 135–450)
PMV BLD AUTO: 10.3 FL (ref 5–10.5)
POTASSIUM REFLEX MAGNESIUM: 4.4 MMOL/L (ref 3.5–5.1)
RBC # BLD: 4.88 M/UL (ref 4.2–5.9)
SODIUM BLD-SCNC: 137 MMOL/L (ref 136–145)
TOTAL PROTEIN: 7.9 G/DL (ref 6.4–8.2)
WBC # BLD: 15.2 K/UL (ref 4–11)

## 2020-04-01 PROCEDURE — 6360000002 HC RX W HCPCS: Performed by: PHYSICIAN ASSISTANT

## 2020-04-01 PROCEDURE — 85025 COMPLETE CBC W/AUTO DIFF WBC: CPT

## 2020-04-01 PROCEDURE — 80053 COMPREHEN METABOLIC PANEL: CPT

## 2020-04-01 PROCEDURE — 96372 THER/PROPH/DIAG INJ SC/IM: CPT

## 2020-04-01 PROCEDURE — 73630 X-RAY EXAM OF FOOT: CPT

## 2020-04-01 PROCEDURE — 99283 EMERGENCY DEPT VISIT LOW MDM: CPT

## 2020-04-01 RX ORDER — HYDROCODONE BITARTRATE AND ACETAMINOPHEN 5; 325 MG/1; MG/1
1 TABLET ORAL EVERY 6 HOURS PRN
Qty: 10 TABLET | Refills: 0 | Status: SHIPPED | OUTPATIENT
Start: 2020-04-01 | End: 2020-04-04

## 2020-04-01 RX ORDER — KETOROLAC TROMETHAMINE 30 MG/ML
30 INJECTION, SOLUTION INTRAMUSCULAR; INTRAVENOUS ONCE
Status: COMPLETED | OUTPATIENT
Start: 2020-04-01 | End: 2020-04-01

## 2020-04-01 RX ORDER — INDOMETHACIN 50 MG/1
50 CAPSULE ORAL
Qty: 20 CAPSULE | Refills: 0 | Status: SHIPPED | OUTPATIENT
Start: 2020-04-01 | End: 2020-07-03

## 2020-04-01 RX ADMIN — KETOROLAC TROMETHAMINE 30 MG: 30 INJECTION, SOLUTION INTRAMUSCULAR at 09:17

## 2020-04-01 ASSESSMENT — PAIN DESCRIPTION - DESCRIPTORS: DESCRIPTORS: CONSTANT

## 2020-04-01 ASSESSMENT — PAIN DESCRIPTION - LOCATION: LOCATION: TOE (COMMENT WHICH ONE)

## 2020-04-01 ASSESSMENT — ENCOUNTER SYMPTOMS
VOMITING: 0
SHORTNESS OF BREATH: 0
NAUSEA: 0
ABDOMINAL PAIN: 0

## 2020-04-01 ASSESSMENT — PAIN DESCRIPTION - ONSET: ONSET: ON-GOING

## 2020-04-01 ASSESSMENT — PAIN DESCRIPTION - ORIENTATION: ORIENTATION: LEFT

## 2020-04-01 ASSESSMENT — PAIN DESCRIPTION - PROGRESSION: CLINICAL_PROGRESSION: GRADUALLY WORSENING

## 2020-04-01 ASSESSMENT — PAIN DESCRIPTION - PAIN TYPE: TYPE: ACUTE PAIN

## 2020-04-01 ASSESSMENT — PAIN - FUNCTIONAL ASSESSMENT: PAIN_FUNCTIONAL_ASSESSMENT: PREVENTS OR INTERFERES SOME ACTIVE ACTIVITIES AND ADLS

## 2020-04-01 ASSESSMENT — PAIN DESCRIPTION - FREQUENCY: FREQUENCY: CONTINUOUS

## 2020-04-01 ASSESSMENT — PAIN SCALES - GENERAL
PAINLEVEL_OUTOF10: 6
PAINLEVEL_OUTOF10: 6

## 2020-04-01 NOTE — ED PROVIDER NOTES
[address] [facility]        Pt Name: Zuhair Jerez  MRN: [id number]  [place] 1960  Date of evaluation: 4/1/2020  Provider: ADAM Dee    This patient was not seen and evaluated by the attending physician No att. providers found. CHIEF COMPLAINT       Chief Complaint   Patient presents with    Toe Pain     pt states that he has gout in his left foot          HISTORY OF PRESENT ILLNESS  (Location/Symptom, Timing/Onset, Context/Setting, Quality, Duration,Modifying Factors, Severity.)   Zuhair Jerez is a 62 y.o. male who presents to the emergency department for left great toe pain for the past few days. He reports this is from gout. Similar to prior episodes. Usually gets it in this location. Has not taken anything for pain. Denies any injuries. Denies fever chills nausea vomiting abdominal pain chest pain, shortness of breath. He denies hx of DM but chart does say hx DM. Nursing Notes were reviewed and I agree. OF SYSTEMS    (2-9 systems for level 4, 10 or more for level 5)     Review of Systems   Constitutional: Negative for chills and fever. Respiratory: Negative for shortness of breath. Cardiovascular: Negative for chest pain. Gastrointestinal: Negative for abdominal pain, nausea and vomiting. Musculoskeletal: Positive for arthralgias. Except as noted above the remainder of the review of systems was reviewed and negative.        PAST MEDICAL HISTORY         Diagnosis Date    Gout     Hypertension 02/12/2018    Prediabetes 02/12/2018       SURGICAL HISTORY         Procedure Laterality Date    SHOULDER SURGERY  2014       CURRENT MEDICATIONS       Previous Medications    ALLOPURINOL (ZYLOPRIM) 100 MG TABLET    Take 2 tablets by mouth daily    ATORVASTATIN (LIPITOR) 20 MG TABLET    Take 1 tablet by mouth daily    LISINOPRIL (PRINIVIL;ZESTRIL) 20 MG TABLET    Take 1 tablet by mouth daily    METFORMIN and MRI are read by the radiologist. Plain radiographic images are visualized and preliminarily interpreted by ADAM Khan with the below findings:      Interpretation per the Radiologist below, if available at the time of this note:    XR FOOT LEFT (MIN 3 VIEWS)   Final Result   1. No acute osseous abnormality. LABS:  Results for orders placed or performed during the hospital encounter of 04/01/20   CBC Auto Differential   Result Value Ref Range    WBC 15.2 (H) 4.0 - 11.0 K/uL    RBC 4.88 4.20 - 5.90 M/uL    Hemoglobin 14.9 13.5 - 17.5 g/dL    Hematocrit 44.8 40.5 - 52.5 %    MCV 91.7 80.0 - 100.0 fL    MCH 30.4 26.0 - 34.0 pg    MCHC 33.2 31.0 - 36.0 g/dL    RDW 13.7 12.4 - 15.4 %    Platelets 847 929 - 405 K/uL    MPV 10.3 5.0 - 10.5 fL    Neutrophils % 58.9 %    Lymphocytes % 31.8 %    Monocytes % 8.3 %    Eosinophils % 0.3 %    Basophils % 0.7 %    Neutrophils Absolute 8.9 (H) 1.7 - 7.7 K/uL    Lymphocytes Absolute 4.8 1.0 - 5.1 K/uL    Monocytes Absolute 1.3 0.0 - 1.3 K/uL    Eosinophils Absolute 0.0 0.0 - 0.6 K/uL    Basophils Absolute 0.1 0.0 - 0.2 K/uL   Comprehensive Metabolic Panel w/ Reflex to MG   Result Value Ref Range    Sodium 137 136 - 145 mmol/L    Potassium reflex Magnesium 4.4 3.5 - 5.1 mmol/L    Chloride 101 99 - 110 mmol/L    CO2 19 (L) 21 - 32 mmol/L    Anion Gap 17 (H) 3 - 16    Glucose 139 (H) 70 - 99 mg/dL    BUN 24 (H) 7 - 20 mg/dL    CREATININE 0.9 0.9 - 1.3 mg/dL    GFR Non-African American >60 >60    GFR African American >60 >60    Calcium 9.6 8.3 - 10.6 mg/dL    Total Protein 7.9 6.4 - 8.2 g/dL    Alb 4.4 3.4 - 5.0 g/dL    Albumin/Globulin Ratio 1.3 1.1 - 2.2    Total Bilirubin 0.3 0.0 - 1.0 mg/dL    Alkaline Phosphatase 169 (H) 40 - 129 U/L    ALT 56 (H) 10 - 40 U/L    AST 46 (H) 15 - 37 U/L    Globulin 3.5 g/dL       All other labs were within normal range or not returned as of this dictation.     EMERGENCY DEPARTMENT COURSE and DIFFERENTIALDIAGNOSIS/MDM:

## 2020-05-26 ENCOUNTER — HOSPITAL ENCOUNTER (EMERGENCY)
Age: 60
Discharge: HOME | End: 2020-05-26
Attending: EMERGENCY MEDICINE
Payer: MEDICARE

## 2020-05-26 ENCOUNTER — APPOINTMENT (OUTPATIENT)
Dept: GENERAL RADIOLOGY | Age: 60
End: 2020-05-26
Payer: MEDICARE

## 2020-05-26 VITALS
WEIGHT: 180.34 LBS | SYSTOLIC BLOOD PRESSURE: 158 MMHG | BODY MASS INDEX: 28.3 KG/M2 | HEART RATE: 60 BPM | DIASTOLIC BLOOD PRESSURE: 95 MMHG | TEMPERATURE: 98.3 F | HEIGHT: 67 IN | OXYGEN SATURATION: 99 % | RESPIRATION RATE: 16 BRPM

## 2020-05-26 PROCEDURE — 99283 EMERGENCY DEPT VISIT LOW MDM: CPT

## 2020-05-26 PROCEDURE — 6360000002 HC RX W HCPCS: Performed by: EMERGENCY MEDICINE

## 2020-05-26 PROCEDURE — 96372 THER/PROPH/DIAG INJ SC/IM: CPT

## 2020-05-26 PROCEDURE — 6370000000 HC RX 637 (ALT 250 FOR IP): Performed by: EMERGENCY MEDICINE

## 2020-05-26 PROCEDURE — 73630 X-RAY EXAM OF FOOT: CPT

## 2020-05-26 RX ORDER — HYDROCODONE BITARTRATE AND ACETAMINOPHEN 5; 325 MG/1; MG/1
2 TABLET ORAL ONCE
Status: COMPLETED | OUTPATIENT
Start: 2020-05-26 | End: 2020-05-26

## 2020-05-26 RX ORDER — KETOROLAC TROMETHAMINE 30 MG/ML
15 INJECTION, SOLUTION INTRAMUSCULAR; INTRAVENOUS ONCE
Status: COMPLETED | OUTPATIENT
Start: 2020-05-26 | End: 2020-05-26

## 2020-05-26 RX ORDER — NAPROXEN 500 MG/1
500 TABLET ORAL 2 TIMES DAILY PRN
Qty: 30 TABLET | Refills: 0 | Status: SHIPPED | OUTPATIENT
Start: 2020-05-26 | End: 2020-07-03

## 2020-05-26 RX ADMIN — HYDROCODONE BITARTRATE AND ACETAMINOPHEN 2 TABLET: 5; 325 TABLET ORAL at 04:35

## 2020-05-26 RX ADMIN — KETOROLAC TROMETHAMINE 15 MG: 30 INJECTION, SOLUTION INTRAMUSCULAR at 04:35

## 2020-05-26 ASSESSMENT — PAIN DESCRIPTION - PROGRESSION: CLINICAL_PROGRESSION: GRADUALLY WORSENING

## 2020-05-26 ASSESSMENT — ENCOUNTER SYMPTOMS
ABDOMINAL DISTENTION: 0
SHORTNESS OF BREATH: 0
COLOR CHANGE: 0
EYE REDNESS: 0
WHEEZING: 0
ABDOMINAL PAIN: 0
CHEST TIGHTNESS: 0
VOMITING: 0
DIARRHEA: 0
COUGH: 0
CHOKING: 0
EYE DISCHARGE: 0
NAUSEA: 0
EYE ITCHING: 0
STRIDOR: 0
EYE PAIN: 0
CONSTIPATION: 0
PHOTOPHOBIA: 0
APNEA: 0
ANAL BLEEDING: 0
RECTAL PAIN: 0
BLOOD IN STOOL: 0

## 2020-05-26 ASSESSMENT — PAIN SCALES - GENERAL
PAINLEVEL_OUTOF10: 10
PAINLEVEL_OUTOF10: 10

## 2020-05-26 ASSESSMENT — PAIN DESCRIPTION - ONSET: ONSET: PROGRESSIVE

## 2020-05-26 ASSESSMENT — PAIN DESCRIPTION - FREQUENCY: FREQUENCY: CONTINUOUS

## 2020-05-26 ASSESSMENT — PAIN DESCRIPTION - PAIN TYPE: TYPE: ACUTE PAIN

## 2020-05-26 ASSESSMENT — PAIN - FUNCTIONAL ASSESSMENT: PAIN_FUNCTIONAL_ASSESSMENT: PREVENTS OR INTERFERES SOME ACTIVE ACTIVITIES AND ADLS

## 2020-05-26 ASSESSMENT — PAIN DESCRIPTION - LOCATION: LOCATION: ANKLE

## 2020-05-26 ASSESSMENT — PAIN DESCRIPTION - DESCRIPTORS: DESCRIPTORS: SHARP;THROBBING

## 2020-05-26 ASSESSMENT — PAIN DESCRIPTION - ORIENTATION: ORIENTATION: LEFT

## 2020-05-26 NOTE — ED PROVIDER NOTES
Except as noted above the remainder of the review of systems was reviewed and negative. PAST MEDICAL HISTORY     Past Medical History:   Diagnosis Date    Gout     Hypertension 02/12/2018    Prediabetes 02/12/2018       SURGICAL HISTORY       Past Surgical History:   Procedure Laterality Date    SHOULDER SURGERY  2014       CURRENT MEDICATIONS       Discharge Medication List as of 5/26/2020  5:04 AM      CONTINUE these medications which have NOT CHANGED    Details   indomethacin (INDOCIN) 50 MG capsule Take 1 capsule by mouth 3 times daily (with meals), Disp-20 capsule, R-0Print      atorvastatin (LIPITOR) 20 MG tablet Take 1 tablet by mouth daily, Disp-30 tablet, R-2Normal      metFORMIN (GLUCOPHAGE) 500 MG tablet Take 1 tablet by mouth 2 times daily (with meals), Disp-60 tablet, R-2Normal      lisinopril (PRINIVIL;ZESTRIL) 20 MG tablet Take 1 tablet by mouth daily, Disp-60 tablet, R-1Spanish labels pleaseNormal      Omega-3 Fatty Acids (OMEGA-3 FISH OIL) 1000 MG CAPS Take 1 capsule by mouth daily, Disp-90 capsule, R-2Spanish label pleaseNormal      allopurinol (ZYLOPRIM) 100 MG tablet Take 2 tablets by mouth daily, Disp-30 tablet, R-3Spanish label pleaseNormal             ALLERGIES     Patient has no known allergies. FAMILY HISTORY      History reviewed. No pertinent family history.     SOCIAL HISTORY       Social History     Socioeconomic History    Marital status: Single     Spouse name: None    Number of children: None    Years of education: None    Highest education level: None   Occupational History    None   Social Needs    Financial resource strain: None    Food insecurity     Worry: None     Inability: None    Transportation needs     Medical: None     Non-medical: None   Tobacco Use    Smoking status: Never Smoker    Smokeless tobacco: Never Used   Substance and Sexual Activity    Alcohol use: No    Drug use: No    Sexual activity: None   Lifestyle    Physical activity Days per week: None     Minutes per session: None    Stress: None   Relationships    Social connections     Talks on phone: None     Gets together: None     Attends Sikhism service: None     Active member of club or organization: None     Attends meetings of clubs or organizations: None     Relationship status: None    Intimate partner violence     Fear of current or ex partner: None     Emotionally abused: None     Physically abused: None     Forced sexual activity: None   Other Topics Concern    None   Social History Narrative    None       PHYSICAL EXAM       ED Triage Vitals [05/26/20 0411]   BP Temp Temp Source Pulse Resp SpO2 Height Weight   (!) 182/96 98.3 °F (36.8 °C) Oral 62 16 98 % 5' 7\" (1.702 m) 180 lb 5.4 oz (81.8 kg)       Physical Exam  Constitutional:       General: He is not in acute distress. Appearance: He is well-developed. He is not diaphoretic. HENT:      Head: Normocephalic and atraumatic. Eyes:      General:         Right eye: No discharge. Left eye: No discharge. Pupils: Pupils are equal, round, and reactive to light. Neck:      Musculoskeletal: Normal range of motion. Thyroid: No thyromegaly. Trachea: No tracheal deviation. Cardiovascular:      Rate and Rhythm: Normal rate and regular rhythm. Heart sounds: No murmur. Pulmonary:      Breath sounds: No wheezing or rales. Chest:      Chest wall: No tenderness. Abdominal:      General: There is no distension. Palpations: Abdomen is soft. There is no mass. Tenderness: There is no abdominal tenderness. There is no guarding or rebound. Musculoskeletal: Normal range of motion. General: No tenderness or deformity. Comments: No TTP or redness to left foot. Range of motion normal.    Skin:     General: Skin is warm. Coloration: Skin is not pale. Findings: No erythema or rash. Neurological:      Mental Status: He is alert.       Cranial Nerves: No cranial nerve deficit. Motor: No abnormal muscle tone. Coordination: Coordination normal.         DIAGNOSTIC RESULTS     EKG: All EKG's are interpreted by the Emergency Department Physician who either signs or Co-signs this chart in the absence of acardiologist.    None    RADIOLOGY:   Non-plain film images such as CT, Ultrasoundand MRI are read by the radiologist. Plain radiographic images are visualized and preliminarily interpreted by the emergency physician with the below findings:    Impression   1. No acute significant findings nor significant degenerative changes in the   left foot. 2. Bony demineralization. ED BEDSIDE ULTRASOUND:   Performed by ED Physician - none    LABS:  Labs Reviewed - No data to display    All other labs were withinnormal range or not returned as of this dictation. EMERGENCY DEPARTMENT COURSE and DIFFERENTIAL DIAGNOSIS/MDM:     No legs swelling    Ortho follow up given    I discussed with patient the results of evaluation in the ED, diagnosis, care, and prognosis. The plan is to discharge to home. Patient is in agreement with plan and questions have been answered.      I also discussed with patient the reasons which may require a return visit and the importance of follow-up care. The patient is well-appearing, nontoxic, and improved at the time of discharge. Patient agrees to call to arrange follow-up care as directed. Patient understands to return immediately for worsening/change in symptoms. CRITICAL CARE TIME   Total Critical Caretime was 0 minutes, excluding separately reportable procedures. There was a high probability of clinically significant/life threatening deterioration in the patient's condition which required my urgent intervention. PROCEDURES:  Unlessotherwise noted below, none    FINAL IMPRESSION      1.  Left foot pain          DISPOSITION/PLAN   DISPOSITION Decision To Discharge 05/26/2020 04:59:43 AM    PATIENT REFERRED TO:  THE [facility] [address] [id number]  [phone]          Alla Massey, [address] [facility]  [address] [place] [address]  [phone]            DISCHARGE MEDICATIONS:  Discharge Medication List as of 5/26/2020  5:04 AM      START taking these medications    Details   naproxen (NAPROSYN) 500 MG tablet Take 1 tablet by mouth 2 times daily as needed for Pain, Disp-30 tablet, R-0Print                (Please note that portions ofthis note were completed with a voice recognition program.  Efforts were made to edit the dictations but occasionally words are mis-transcribed.)    Josemanuel Espinoza MD(electronically signed)  Attending Emergency Physician            Josemanuel Espinoza MD  05/26/20 [id number]

## 2020-06-18 ENCOUNTER — APPOINTMENT (OUTPATIENT)
Dept: GENERAL RADIOLOGY | Age: 60
End: 2020-06-18
Payer: MEDICARE

## 2020-06-18 ENCOUNTER — HOSPITAL ENCOUNTER (EMERGENCY)
Age: 60
Discharge: HOME | End: 2020-06-18
Payer: MEDICARE

## 2020-06-18 VITALS
BODY MASS INDEX: 28.2 KG/M2 | SYSTOLIC BLOOD PRESSURE: 153 MMHG | WEIGHT: 179.68 LBS | HEART RATE: 63 BPM | OXYGEN SATURATION: 99 % | HEIGHT: 67 IN | DIASTOLIC BLOOD PRESSURE: 86 MMHG | TEMPERATURE: 98.2 F | RESPIRATION RATE: 18 BRPM

## 2020-06-18 PROCEDURE — 99283 EMERGENCY DEPT VISIT LOW MDM: CPT

## 2020-06-18 PROCEDURE — 6370000000 HC RX 637 (ALT 250 FOR IP): Performed by: PHYSICIAN ASSISTANT

## 2020-06-18 PROCEDURE — 73630 X-RAY EXAM OF FOOT: CPT

## 2020-06-18 RX ORDER — INDOMETHACIN 25 MG/1
25 CAPSULE ORAL 2 TIMES DAILY WITH MEALS
Qty: 20 CAPSULE | Refills: 0 | Status: SHIPPED | OUTPATIENT
Start: 2020-06-18 | End: 2020-07-03

## 2020-06-18 RX ORDER — OXYCODONE HYDROCHLORIDE AND ACETAMINOPHEN 5; 325 MG/1; MG/1
1 TABLET ORAL ONCE
Status: COMPLETED | OUTPATIENT
Start: 2020-06-18 | End: 2020-06-18

## 2020-06-18 RX ORDER — INDOMETHACIN 25 MG/1
50 CAPSULE ORAL ONCE
Status: COMPLETED | OUTPATIENT
Start: 2020-06-18 | End: 2020-06-18

## 2020-06-18 RX ADMIN — INDOMETHACIN 50 MG: 25 CAPSULE ORAL at 15:16

## 2020-06-18 RX ADMIN — OXYCODONE HYDROCHLORIDE AND ACETAMINOPHEN 1 TABLET: 5; 325 TABLET ORAL at 14:22

## 2020-06-18 ASSESSMENT — PAIN DESCRIPTION - LOCATION
LOCATION: FOOT

## 2020-06-18 ASSESSMENT — ENCOUNTER SYMPTOMS
EYE REDNESS: 0
BACK PAIN: 0
APNEA: 0
CHOKING: 0
NAUSEA: 0
FACIAL SWELLING: 0
SHORTNESS OF BREATH: 0
EYE DISCHARGE: 0
VOMITING: 0
ABDOMINAL PAIN: 0

## 2020-06-18 ASSESSMENT — PAIN DESCRIPTION - DESCRIPTORS
DESCRIPTORS: ACHING;STABBING
DESCRIPTORS: ACHING

## 2020-06-18 ASSESSMENT — PAIN DESCRIPTION - ORIENTATION
ORIENTATION: RIGHT

## 2020-06-18 ASSESSMENT — PAIN SCALES - GENERAL
PAINLEVEL_OUTOF10: 8
PAINLEVEL_OUTOF10: 10
PAINLEVEL_OUTOF10: 10
PAINLEVEL_OUTOF10: 8
PAINLEVEL_OUTOF10: 3

## 2020-06-18 ASSESSMENT — PAIN DESCRIPTION - PAIN TYPE
TYPE: ACUTE PAIN

## 2020-06-18 ASSESSMENT — PAIN DESCRIPTION - ONSET: ONSET: ON-GOING

## 2020-06-18 ASSESSMENT — PAIN DESCRIPTION - FREQUENCY: FREQUENCY: CONTINUOUS

## 2020-06-18 ASSESSMENT — PAIN - FUNCTIONAL ASSESSMENT: PAIN_FUNCTIONAL_ASSESSMENT: PREVENTS OR INTERFERES SOME ACTIVE ACTIVITIES AND ADLS

## 2020-06-18 ASSESSMENT — PAIN DESCRIPTION - PROGRESSION: CLINICAL_PROGRESSION: NOT CHANGED

## 2020-06-18 NOTE — ED PROVIDER NOTES
**EVALUATED BY ADVANCED PRACTICE PROVIDER**        [facility] ENCOUNTER      Pt Name: Yaz Gonzalez  [id number]  [place] 1960  Date of evaluation: 6/18/2020  Provider: Analia Milton PA-C      Chief Complaint:    Chief Complaint   Patient presents with    Foot Pain     states h/o gout, was in the left foot but now in the right foot x 2 weeks, medications not helping       Nursing Notes, Past Medical Hx, Past Surgical Hx, Social Hx, Allergies, and Family Hx were all reviewed and agreed with or any disagreements were addressed in the HPI.    HPI:  (Location, Duration, Timing, Severity, Quality, Assoc Sx, Context, Modifying factors)  This is a  62 y.o. male complaint of right foot pain. Pain mainly at the MTP joint. Denies injury. Said he had same pain on the left foot couple weeks ago. He was put on naproxen and allopurinol. He think it may be gout. Pain in his right foot started last night. He took the naproxen and allopurinol at 11:00 this morning said it did not help. No numbness or tingling in his feet. No ankle pain no hip pain no knee pain. No other joint pain or swelling.       PastMedical/Surgical History:      Diagnosis Date    Gout     Hypertension 02/12/2018    Prediabetes 02/12/2018         Procedure Laterality Date    SHOULDER SURGERY  2014       Medications:  Previous Medications    ALLOPURINOL (ZYLOPRIM) 100 MG TABLET    Take 2 tablets by mouth daily    ATORVASTATIN (LIPITOR) 20 MG TABLET    Take 1 tablet by mouth daily    INDOMETHACIN (INDOCIN) 50 MG CAPSULE    Take 1 capsule by mouth 3 times daily (with meals)    LISINOPRIL (PRINIVIL;ZESTRIL) 20 MG TABLET    Take 1 tablet by mouth daily    METFORMIN (GLUCOPHAGE) 500 MG TABLET    Take 1 tablet by mouth 2 times daily (with meals)    NAPROXEN (NAPROSYN) 500 MG TABLET    Take 1 tablet by mouth 2 times daily as needed for Pain    OMEGA-3 FATTY ACIDS (OMEGA-3 FISH OIL) 1000 MG CAPS    Take 1 capsule by mouth daily         Review of Systems:  Review of Systems   Constitutional: Negative for chills and fever. HENT: Negative for congestion, facial swelling and sneezing. Eyes: Negative for discharge and redness. Respiratory: Negative for apnea, choking and shortness of breath. Cardiovascular: Negative for chest pain. Gastrointestinal: Negative for abdominal pain, nausea and vomiting. Genitourinary: Negative for dysuria. Musculoskeletal: Negative for back pain, neck pain and neck stiffness. Neurological: Negative for dizziness, tremors, seizures and headaches. All other systems reviewed and are negative. Positives and Pertinent negatives as per HPI. Except as noted above in the ROS, problem specific ROS was completed and is negative. Physical Exam:  Physical Exam  Constitutional:       Appearance: He is well-developed. He is not diaphoretic. HENT:      Head: Normocephalic and atraumatic. Nose: Nose normal.   Eyes:      General:         Right eye: No discharge. Left eye: No discharge. Neck:      Musculoskeletal: Normal range of motion and neck supple. Cardiovascular:      Rate and Rhythm: Normal rate and regular rhythm. Heart sounds: Normal heart sounds. No murmur. No friction rub. No gallop. Pulmonary:      Effort: Pulmonary effort is normal. No respiratory distress. Breath sounds: Normal breath sounds. No wheezing or rales. Chest:      Chest wall: No tenderness. Musculoskeletal:      Right foot: Decreased range of motion. Tenderness and bony tenderness present. Feet:    Skin:     General: Skin is warm and dry. Neurological:      Mental Status: He is alert and oriented to person, place, and time.    Psychiatric:         Behavior: Behavior normal.         MEDICAL DECISION MAKING    Vitals:    Vitals:    06/18/20 1336 06/18/20 1432   BP: (!) 161/100 (!) 160/92   Pulse: 71    Resp: 16    Temp: 98.2 °F (36.8 °C)

## 2020-07-03 ENCOUNTER — APPOINTMENT (OUTPATIENT)
Dept: GENERAL RADIOLOGY | Age: 60
End: 2020-07-03
Payer: MEDICARE

## 2020-07-03 ENCOUNTER — HOSPITAL ENCOUNTER (EMERGENCY)
Age: 60
Discharge: HOME | End: 2020-07-03
Payer: MEDICARE

## 2020-07-03 VITALS
BODY MASS INDEX: 28.46 KG/M2 | RESPIRATION RATE: 16 BRPM | DIASTOLIC BLOOD PRESSURE: 96 MMHG | SYSTOLIC BLOOD PRESSURE: 152 MMHG | HEART RATE: 54 BPM | OXYGEN SATURATION: 99 % | TEMPERATURE: 98.3 F | WEIGHT: 181.3 LBS | HEIGHT: 67 IN

## 2020-07-03 PROCEDURE — 99283 EMERGENCY DEPT VISIT LOW MDM: CPT

## 2020-07-03 PROCEDURE — 6370000000 HC RX 637 (ALT 250 FOR IP): Performed by: PHYSICIAN ASSISTANT

## 2020-07-03 PROCEDURE — 73630 X-RAY EXAM OF FOOT: CPT

## 2020-07-03 RX ORDER — INDOMETHACIN 50 MG/1
50 CAPSULE ORAL 2 TIMES DAILY WITH MEALS
Qty: 60 CAPSULE | Refills: 0 | Status: ON HOLD | OUTPATIENT
Start: 2020-07-03 | End: 2021-02-12 | Stop reason: HOSPADM

## 2020-07-03 RX ORDER — HYDROCODONE BITARTRATE AND ACETAMINOPHEN 5; 325 MG/1; MG/1
1 TABLET ORAL ONCE
Status: COMPLETED | OUTPATIENT
Start: 2020-07-03 | End: 2020-07-03

## 2020-07-03 RX ORDER — INDOMETHACIN 25 MG/1
50 CAPSULE ORAL ONCE
Status: COMPLETED | OUTPATIENT
Start: 2020-07-03 | End: 2020-07-03

## 2020-07-03 RX ORDER — HYDROCODONE BITARTRATE AND ACETAMINOPHEN 5; 325 MG/1; MG/1
1 TABLET ORAL EVERY 8 HOURS PRN
Qty: 8 TABLET | Refills: 0 | Status: SHIPPED | OUTPATIENT
Start: 2020-07-03 | End: 2020-07-06

## 2020-07-03 RX ADMIN — HYDROCODONE BITARTRATE AND ACETAMINOPHEN 1 TABLET: 5; 325 TABLET ORAL at 15:21

## 2020-07-03 RX ADMIN — INDOMETHACIN 50 MG: 25 CAPSULE ORAL at 15:21

## 2020-07-03 ASSESSMENT — PAIN DESCRIPTION - PROGRESSION: CLINICAL_PROGRESSION: GRADUALLY WORSENING

## 2020-07-03 ASSESSMENT — PAIN DESCRIPTION - LOCATION
LOCATION: FOOT
LOCATION: FOOT

## 2020-07-03 ASSESSMENT — PAIN SCALES - GENERAL
PAINLEVEL_OUTOF10: 10
PAINLEVEL_OUTOF10: 10

## 2020-07-03 ASSESSMENT — PAIN DESCRIPTION - PAIN TYPE
TYPE: ACUTE PAIN
TYPE: ACUTE PAIN

## 2020-07-03 ASSESSMENT — PAIN DESCRIPTION - ONSET: ONSET: PROGRESSIVE

## 2020-07-03 ASSESSMENT — PAIN DESCRIPTION - FREQUENCY: FREQUENCY: CONTINUOUS

## 2020-07-03 ASSESSMENT — PAIN DESCRIPTION - ORIENTATION: ORIENTATION: RIGHT;MID;ANTERIOR

## 2020-07-03 ASSESSMENT — PAIN - FUNCTIONAL ASSESSMENT
PAIN_FUNCTIONAL_ASSESSMENT: PREVENTS OR INTERFERES SOME ACTIVE ACTIVITIES AND ADLS
PAIN_FUNCTIONAL_ASSESSMENT: 0-10

## 2020-07-03 NOTE — ED PROVIDER NOTES
[address]  [address] [address]  Dept: [phone]  Loc: [address] ENCOUNTER        This patient was not seen or evaluated by the attending physician. I evaluated this patient, the attending physician was available for consultation. CHIEF COMPLAINT    Chief Complaint   Patient presents with    Foot Pain     Diabetic patient with recurrent right foot swelling at the base of right toe. Patient previously on Gabapentin but no longer. No open wound, no known injury       HPI    Irma Salinas is a 62 y.o. male who presents with right foot pain. He is primarily [place] speaking, so  # [id number] is used for the encounter. The onset was 3 weeks ago. The duration has been constant since the onset. The quality of the pain is sharp and the severity is 10/10. The context is that the symptoms started without any known trauma. He has a h/o gout, and this feels similar. His symptoms started months ago, and wax and wane. He has an appointment with podiatry on 7/8. He has not been taking any maintenance medication. He had been discharged with medication in the past but has run out. He has no fever, chills, nausea, vomiting. He denies any falls or traumas. He has no further complaints at this time. REVIEW OF SYSTEMS    General: No fever or chills  Cardiac: No chest pain  Pulmonary: No shortness of breath  Musculoskeletal: see HPI  All other systems reviewed and are negative.     PAST MEDICAL & SURGICAL HISTORY    Past Medical History:   Diagnosis Date    Gout     Hypertension 02/12/2018    Prediabetes 02/12/2018     Past Surgical History:   Procedure Laterality Date    SHOULDER SURGERY  2014       CURRENT MEDICATIONS  (may include discharge medications prescribed in the ED)  Current Outpatient Rx   Medication Sig Dispense Refill    indomethacin (INDOCIN) 50 MG capsule Take 1 capsule by mouth 2 times daily (with meals) 60 capsule 0    HYDROcodone-acetaminophen (NORCO) 5-325 MG per tablet Take 1 tablet by mouth every 8 hours as needed for Pain for up to 3 days. 8 tablet 0    atorvastatin (LIPITOR) 20 MG tablet Take 1 tablet by mouth daily 30 tablet 2    metFORMIN (GLUCOPHAGE) 500 MG tablet Take 1 tablet by mouth 2 times daily (with meals) 60 tablet 2    lisinopril (PRINIVIL;ZESTRIL) 20 MG tablet Take 1 tablet by mouth daily 60 tablet 1    Omega-3 Fatty Acids (OMEGA-3 FISH OIL) 1000 MG CAPS Take 1 capsule by mouth daily 90 capsule 2    allopurinol (ZYLOPRIM) 100 MG tablet Take 2 tablets by mouth daily 30 tablet 3       ALLERGIES    No Known Allergies    SOCIAL & FAMILY HISTORY    Social History     Socioeconomic History    Marital status: Single     Spouse name: None    Number of children: None    Years of education: None    Highest education level: None   Occupational History    None   Social Needs    Financial resource strain: None    Food insecurity     Worry: None     Inability: None    Transportation needs     Medical: None     Non-medical: None   Tobacco Use    Smoking status: Never Smoker    Smokeless tobacco: Never Used   Substance and Sexual Activity    Alcohol use: No    Drug use: No    Sexual activity: Yes     Partners: Female   Lifestyle    Physical activity     Days per week: None     Minutes per session: None    Stress: None   Relationships    Social connections     Talks on phone: None     Gets together: None     Attends Oriental orthodox service: None     Active member of club or organization: None     Attends meetings of clubs or organizations: None     Relationship status: None    Intimate partner violence     Fear of current or ex partner: None     Emotionally abused: None     Physically abused: None     Forced sexual activity: None   Other Topics Concern    None   Social History Narrative    None     History reviewed. No pertinent family history.       PHYSICAL EXAM    VITAL SIGNS: BP (!) 152/96   Pulse 62   Temp 98.3 °F (36.8 °C) (Oral)   Resp 17   Ht 5' 7\" (1.702 m)   Wt 181 lb 4.8 oz (82.2 kg)   SpO2 99%   BMI 28.40 kg/m²   Constitutional:  Well developed, well nourished, no acute distress, non-toxic appearance   HENT:  Atraumatic, external ears normal, nose normal  Neck:  normal range of motion, supple   Respiratory:  No respiratory distress  Cardiovascular:  regular rate, no murmurs  Vascular:  right and left DP pulse 2+  GI:  Soft, normal bowel sounds, nontender   Musculoskeletal:  + tenderness of the 1st MTP joint,  no lower extremity edema, no lower extremity asymmetry, no calf tenderness, no thigh tenderness, no acute deformities, both calves are soft, no evidence of increased compartmental pressure  Integument:  Skin warm and dry, no redness or induration of the lower extremity skin  Neurologic:  Awake, alert, no slurred speech, sensory and motor intact in the affected limb  Psychiatric: Cooperative, pleasant affect    RADIOLOGY/PROCEDURES    XR FOOT RIGHT (MIN 3 VIEWS)   Final Result   No acute process. ED COURSE & MEDICAL DECISION MAKING    Pertinent Labs & Imaging studies reviewed and interpreted. (See chart for details)    Differential diagnosis: includes but not limited to Deep Vein Thrombosis, Arterial Injury/Ischemia, Fracture, Dislocation, Infection, Gout, Compartment Syndrome, Neurologic Deficit/Injury. Patient is afebrile and nontoxic in appearance. Plain films as above with no acute process. Clinically he appears to have gout. He does not have evidence of open wounds or cellulitis. He will be given medication in the ED, d/c home with norco, indomethacin. He is to keep the appointment with podiatry. He is educated regarding diet changes. He is given sedation precautions related to the pain medication. The patient was instructed to follow up as an outpatient in 2 days.  The patient was instructed to return to the ED immediately for any new or worsening symptoms. The patient verbalized understanding. FINAL IMPRESSION    1.  Acute gout of right foot, unspecified cause         PLAN  Discharge with outpatient follow-up      (Please note that this note was completed with a voice recognition program.  Every attempt was made to edit the dictations, but inevitably there remain words that are mis-transcribed.)         Nathanael Reeves, [id number]  07/03/20 9672

## 2020-07-03 NOTE — ED TRIAGE NOTES
Pt to ED with c/o recurrent right foot pain and swelling at the base of the right toe. Pt is diabetic, previously on Gabapentin, no longer taking. No open areas, no known injuries.

## 2021-01-26 ENCOUNTER — HOSPITAL ENCOUNTER (EMERGENCY)
Age: 61
Discharge: HOME | End: 2021-01-26
Payer: MEDICARE

## 2021-01-26 VITALS
BODY MASS INDEX: 28.27 KG/M2 | HEIGHT: 67 IN | HEART RATE: 66 BPM | TEMPERATURE: 98.6 F | SYSTOLIC BLOOD PRESSURE: 158 MMHG | DIASTOLIC BLOOD PRESSURE: 95 MMHG | RESPIRATION RATE: 16 BRPM | OXYGEN SATURATION: 99 % | WEIGHT: 180.12 LBS

## 2021-01-26 DIAGNOSIS — J02.9 ACUTE PHARYNGITIS, UNSPECIFIED ETIOLOGY: Primary | ICD-10-CM

## 2021-01-26 DIAGNOSIS — U07.1 COVID-19: ICD-10-CM

## 2021-01-26 LAB
S PYO AG THROAT QL: NEGATIVE
SARS-COV-2, NAAT: DETECTED

## 2021-01-26 PROCEDURE — 6370000000 HC RX 637 (ALT 250 FOR IP): Performed by: NURSE PRACTITIONER

## 2021-01-26 PROCEDURE — 87081 CULTURE SCREEN ONLY: CPT

## 2021-01-26 PROCEDURE — 6360000002 HC RX W HCPCS: Performed by: NURSE PRACTITIONER

## 2021-01-26 PROCEDURE — 99284 EMERGENCY DEPT VISIT MOD MDM: CPT

## 2021-01-26 PROCEDURE — 87880 STREP A ASSAY W/OPTIC: CPT

## 2021-01-26 PROCEDURE — U0002 COVID-19 LAB TEST NON-CDC: HCPCS

## 2021-01-26 RX ORDER — IBUPROFEN 600 MG/1
600 TABLET ORAL ONCE
Status: COMPLETED | OUTPATIENT
Start: 2021-01-26 | End: 2021-01-26

## 2021-01-26 RX ORDER — DEXAMETHASONE 6 MG/1
6 TABLET ORAL
Qty: 5 TABLET | Refills: 0 | Status: SHIPPED | OUTPATIENT
Start: 2021-01-26 | End: 2021-01-26 | Stop reason: SDUPTHER

## 2021-01-26 RX ORDER — DEXAMETHASONE 6 MG/1
6 TABLET ORAL
Qty: 5 TABLET | Refills: 0 | Status: SHIPPED | OUTPATIENT
Start: 2021-01-26 | End: 2021-01-31

## 2021-01-26 RX ORDER — DEXAMETHASONE 4 MG/1
10 TABLET ORAL ONCE
Status: COMPLETED | OUTPATIENT
Start: 2021-01-26 | End: 2021-01-26

## 2021-01-26 RX ADMIN — IBUPROFEN 600 MG: 600 TABLET, FILM COATED ORAL at 10:32

## 2021-01-26 RX ADMIN — DEXAMETHASONE 10 MG: 4 TABLET ORAL at 10:36

## 2021-01-26 ASSESSMENT — PAIN DESCRIPTION - ONSET: ONSET: ON-GOING

## 2021-01-26 ASSESSMENT — ENCOUNTER SYMPTOMS
BLOOD IN STOOL: 0
CONSTIPATION: 0
SHORTNESS OF BREATH: 0
VOMITING: 0
DIARRHEA: 0
COUGH: 1
RHINORRHEA: 0
NAUSEA: 0
ABDOMINAL PAIN: 0
SORE THROAT: 1

## 2021-01-26 ASSESSMENT — PAIN SCALES - GENERAL
PAINLEVEL_OUTOF10: 6
PAINLEVEL_OUTOF10: 6

## 2021-01-26 ASSESSMENT — PAIN DESCRIPTION - DESCRIPTORS: DESCRIPTORS: SORE

## 2021-01-26 ASSESSMENT — PAIN DESCRIPTION - LOCATION: LOCATION: THROAT

## 2021-01-26 NOTE — ED PROVIDER NOTES
[facility]        Pt Name: Adelia Escobar  MRN: [id number]  [place] 1960  Date of evaluation: 1/26/2021  Provider: CRISTOBAL Michael CNP  PCP: No primary care provider on file. This patient was not seen and evaluated by the attending physician No att. providers found. CHIEF COMPLAINT       Chief Complaint   Patient presents with    Pharyngitis     Since Saturday. States has not eaten since Saturday. HISTORY OF PRESENT ILLNESS   (Location/Symptom, Timing/Onset,Context/Setting, Quality, Duration, Modifying Factors, Severity)  Note limiting factors. Adelia Escobar is a 62 y.o. male who presents today with pain with swallowing that started on Saturday. He rates his 6/10. Initially he had a productive cough, and he has tried to stop coughing due to the pain. He stated it hurts to swallow as opposed to feeling as though something is in the way of his swallowing. He stated he has been taking \"a cup of liquid Tylenol\" every 4-5 hours. He is using the small cup that comes with the Tylenol, which holds 30mL. This is approximately 1g of Tylenol, which he stated does help the pain. Soda makes the pain worse, whereas garlic, lemon, and onion tea makes his throat feel better. He has had decreased po since Saturday, but is taking in fluids and some food. He denies fever, rash, headaches, dizziness, chest pain, shortness of breath, cough, congestion, abdominal pain, nausea, vomiting, diarrhea, constipation, blood in the stool, or painful urination. There is no family at the bedside with this patient. A [place] speaking  was used for this exam. [id number]    Nursing Notes triage note reviewed and agreed with or any disagreements were addressed  in the HPI. REVIEW OF SYSTEMS    (2-9 systems for level 4, 10 or more for level 5)     Review of Systems   Constitutional: Negative for chills and fever. HENT: Positive for drooling and sore throat. Negative for postnasal drip and rhinorrhea. Eyes: Negative for visual disturbance. Respiratory: Positive for cough. Negative for shortness of breath. Cardiovascular: Negative for chest pain. Gastrointestinal: Negative for abdominal pain, blood in stool, constipation, diarrhea, nausea and vomiting. Genitourinary: Negative for dysuria, flank pain and hematuria. Skin: Negative for rash. Neurological: Negative for weakness and headaches. All other systems reviewed and are negative. Positives and Pertinent negatives as per HPI. Except as noted above in the ROS, all other systems were reviewed and negative. PAST MEDICAL HISTORY     Past Medical History:   Diagnosis Date    Gout     Hypertension 02/12/2018    Prediabetes 02/12/2018         SURGICAL HISTORY       Past Surgical History:   Procedure Laterality Date    SHOULDER SURGERY  2014         CURRENT MEDICATIONS       Discharge Medication List as of 1/26/2021 11:51 AM      CONTINUE these medications which have NOT CHANGED    Details   indomethacin (INDOCIN) 50 MG capsule Take 1 capsule by mouth 2 times daily (with meals), Disp-60 capsule, R-0Print      atorvastatin (LIPITOR) 20 MG tablet Take 1 tablet by mouth daily, Disp-30 tablet, R-2Normal      metFORMIN (GLUCOPHAGE) 500 MG tablet Take 1 tablet by mouth 2 times daily (with meals), Disp-60 tablet, R-2Normal      lisinopril (PRINIVIL;ZESTRIL) 20 MG tablet Take 1 tablet by mouth daily, Disp-60 tablet, R-1Spanish labels pleaseNormal      Omega-3 Fatty Acids (OMEGA-3 FISH OIL) 1000 MG CAPS Take 1 capsule by mouth daily, Disp-90 capsule, R-2Spanish label pleaseNormal      allopurinol (ZYLOPRIM) 100 MG tablet Take 2 tablets by mouth daily, Disp-30 tablet, R-3Spanish label pleaseNormal               ALLERGIES     Patient has no known allergies. FAMILY HISTORY     History reviewed. No pertinent family history.        SOCIAL HISTORY       Social History     Socioeconomic History    Marital status: Single     Spouse name: None    Number of children: None    Years of education: None    Highest education level: None   Occupational History    None   Social Needs    Financial resource strain: None    Food insecurity     Worry: None     Inability: None    Transportation needs     Medical: None     Non-medical: None   Tobacco Use    Smoking status: Never Smoker    Smokeless tobacco: Never Used   Substance and Sexual Activity    Alcohol use: No    Drug use: No    Sexual activity: Yes     Partners: Female   Lifestyle    Physical activity     Days per week: None     Minutes per session: None    Stress: None   Relationships    Social connections     Talks on phone: None     Gets together: None     Attends Congregational service: None     Active member of club or organization: None     Attends meetings of clubs or organizations: None     Relationship status: None    Intimate partner violence     Fear of current or ex partner: None     Emotionally abused: None     Physically abused: None     Forced sexual activity: None   Other Topics Concern    None   Social History Narrative    None       SCREENINGS             PHYSICAL EXAM  (up to 7 for level 4, 8 or more for level 5)     ED Triage Vitals [01/26/21 0919]   BP Temp Temp Source Pulse Resp SpO2 Height Weight   (!) 158/95 98.6 °F (37 °C) Oral 66 16 99 % 5' 7\" (1.702 m) 180 lb 1.9 oz (81.7 kg)       Physical Exam  Vitals signs and nursing note reviewed. Constitutional:       Appearance: He is well-developed. He is not diaphoretic. HENT:      Head: Normocephalic and atraumatic. Jaw: No trismus or swelling. Right Ear: Tympanic membrane, ear canal and external ear normal.      Left Ear: Tympanic membrane, ear canal and external ear normal.      Mouth/Throat:      Dentition: No dental abscesses. Pharynx: Uvula midline.  No oropharyngeal exudate, posterior oropharyngeal erythema or uvula swelling. Tonsils: No tonsillar abscesses. 1+ on the right. 1+ on the left. Eyes:      General: No scleral icterus. Right eye: No discharge. Left eye: No discharge. Neck:      Musculoskeletal: Normal range of motion and neck supple. Cardiovascular:      Rate and Rhythm: Normal rate and regular rhythm. Heart sounds: Normal heart sounds. No murmur. No friction rub. No gallop. Pulmonary:      Effort: Pulmonary effort is normal. No respiratory distress. Breath sounds: Normal breath sounds. No stridor. No wheezing or rales. Chest:      Chest wall: No tenderness. Abdominal:      General: Bowel sounds are normal. There is no distension. Palpations: Abdomen is soft. There is no mass. Tenderness: There is no abdominal tenderness. There is no guarding or rebound. Musculoskeletal: Normal range of motion. General: No tenderness. Lymphadenopathy:      Head:      Right side of head: No submental, submandibular or tonsillar adenopathy. Left side of head: No submental, submandibular or tonsillar adenopathy. Cervical: No cervical adenopathy. Skin:     General: Skin is warm and dry. Coloration: Skin is not pale. Neurological:      Mental Status: He is alert and oriented to person, place, and time.       Coordination: Coordination normal.   Psychiatric:         Behavior: Behavior normal.         DIAGNOSTIC RESULTS   LABS:    Labs Reviewed   COVID-19 - Abnormal; Notable for the following components:       Result Value    SARS-CoV-2, NAAT DETECTED (*)     All other components within normal limits    Narrative:     Performed at:  [facility]  [address] [place], [place] [address]   Phone [phone]   STREP SCREEN GROUP A THROAT    Narrative:     Performed at:  [facility]  [address] [place], [place] [address]   Phone [phone]   CULTURE, BETA STREP CONFIRM PLATES       All other labs werewithin normal range or not returned as of this dictation. EKG: All EKG's are interpreted by the Emergency Department Physician who either signs or Co-signs this chart in the absence of acardiologist.  Please see their note for interpretation of EKG. RADIOLOGY:   Interpretation per the Radiologist below, if available at the time of this note:    No orders to display     No results found. PROCEDURES   Unless otherwise noted below, none     Procedures    CRITICAL CARE TIME     There was a high probability of life-threatening clinical deterioration in the patient's condition requiring my urgent intervention. The total critical care time spent while evaluating and treating this patient was at least 0 minutes. This excludes time spent doing separately billable procedures. This includes time at the bedside, data interpretation, medication management, obtaining critical history from collateral sources if the patient is unable to provide it directly, and physician consultation. Specifics of interventions taken and potentially life-threatening diagnostic considerations are listed in the medical decision making. CONSULTS:  None      EMERGENCY DEPARTMENT COURSE and DIFFERENTIAL DIAGNOSIS/MDM:   Vitals:    Vitals:    01/26/21 0919 01/26/21 1158   BP: (!) 158/95 (!) 158/95   Pulse: 66 66   Resp: 16 16   Temp: 98.6 °F (37 °C) 98.6 °F (37 °C)   TempSrc: Oral Oral   SpO2: 99%    Weight: 180 lb 1.9 oz (81.7 kg)    Height: 5' 7\" (1.702 m)        Mery Rash was given the following medications:  Medications   ibuprofen (ADVIL;MOTRIN) tablet 600 mg (600 mg Oral Given 1/26/21 1032)   dexamethasone (DECADRON) tablet 10 mg (10 mg Oral Given 1/26/21 1036)       Mery Rash was evaluated in the emergency department with concern for sore throat. Treated with Decadron and Motrin in the ER. Strep screen is negative, however Covid swab is positive.   The patient is adequately hydrated, clinically nontoxic, and does not have any findings that would suggest impending airway issues. My suspicion is low for peritonsillar abscess, retropharyngeal abscess, laryngomalacia, epiglottitis, anaphylaxis, angioedema, sandi's angina, bacterial tracheitis, or a foreign body in the airway. There is no evidence of sepsis, meningitis, epiglottitis bacterial , acute bacterial sinusitis, streptococcal pharyngitis, otitis media, bacterial pneumonia, or other bacterial infection that would be managed with antibiotics. The patient is tolerating PO without difficulty and is in no acute distress on exam.  BS clear to ascultation. Supportive care recommended at this time and the patient can be managed as an outpatient. Strict return precautions given for changing or worsening pain, trouble swallowing or breathing, neck stiffness, fever, or rash. The patient was specifically advised their symptoms are consistent with possible COVID-19 infection, and they need to self-isolate at home and restrict any activities outside of their home except for seeking medical care, and to wear a facemask whenever around others. The patient was provided specific instructions related to COVID-19, along with recommendations for proper respiratory hygiene and instructions on prevention of transmission of infection to others. Other viral infections are within the differentials. The patient was counseled to closely monitor their symptoms, and to return immediately to the Emergency Department for any difficulty breathing, confusion, dizziness or passing out, vomiting, chest pain, high fevers, weakness, or any other new or concerning symptoms. There is no evidence of emergent medical condition at this time, and the patient will be discharged in good condition. Amado Gibson is stable in the ER and safe to follow as an outpatient. The patient is discharged on the following medications.   They were counseled on how to take the newly prescribed medications:  Discharge Medication List as of 1/26/2021 11:51 AM      START taking these medications    Details   dexamethasone (DECADRON) 6 MG tablet Take 1 tablet by mouth daily (with breakfast) for 5 days, Disp-5 tablet, R-0Print          . Instructed to follow-up with:  [facility]  Call in 1 day  to schedule an appointment with a new primary care provider    Return to the ER for new or worsening symptoms. I evaluated the patient. The physician was available for consultation as needed. The patient and / or the family were informed of the results of any tests, a time was given to answer questions, a plan was proposed and they agreed with plan. FINAL IMPRESSION      1. Acute pharyngitis, unspecified etiology    2.  COVID-19          DISPOSITION/PLAN   DISPOSITION Decision To Discharge 01/26/2021 11:47:52 AM        DISCONTINUED MEDICATIONS:  Discharge Medication List as of 1/26/2021 11:51 AM                   (Please note that portions of this note were completed with a voice recognition program.  Efforts were made to edit the dictations but occasionally words are mis-transcribed.)    CRISTOBAL Hernandez CNP (electronically signed)     CRISTOBAL Hernandez CNP  01/26/21 9232

## 2021-01-27 ENCOUNTER — CARE COORDINATION (OUTPATIENT)
Dept: CARE COORDINATION | Age: 61
End: 2021-01-27

## 2021-01-27 NOTE — CARE COORDINATION
Chart reviewed. Pt seen and evaluated in ED for Acute pharyngitis. Pt given the following referrals/recommendations (see below):    - Call Robyn Ray in 1 day (2021); to schedule an appointment with a new primary care provider    Medication Changes       Dexamethasone 6 mg Oral DAILY WITH BREAKFAST    Initial ED f/u call to pt (see below for COVID results) utilizing [employer] Roly Escamilla #[id number]    SARS-CoV-2, NAAT DETECTEDAbnormal         Patient contacted regarding [id number] diagnosis\". Discussed COVID-19 related testing which was available at this time. Test results were positive. Patient informed of results, if available? Yes    Care Transition Nurse/ Ambulatory Care Manager contacted the patient by telephone to perform post discharge assessment. Call within 2 business days of discharge: Yes. Verified name and  with patient as identifiers. Provided introduction to self, and explanation of the CTN/ACM role, and reason for call due to risk factors for infection and/or exposure to COVID-19. Symptoms reviewed with patient who verbalized the following symptoms: no new symptoms, no worsening symptoms and sore throat. Any questions regarding quarantining. No. \"Why did my results come back faster than my sons? \"     Encouraged to complete medication as prescribed. Discussed symptom management for cough, sore throat, pain/fever. Tea with garlic/onion/lemon. Tylenol 500mg. PCP? Per pt no PCP per pt. Changing healthcare insurance per pt. Offered assistance to make appt or to provide number for pt to call for appt- Declined, pt in process of becoming established with PCP at [facility] and fixing an insurance mistake. [facility] Nurse Triage: Carol 1006: [phone]      Due to no new or worsening symptoms encounter was not routed to provider for escalation. Discussed follow-up appointments.  If no appointment was previously scheduled, appointment scheduling offered: Declined, in process of establishing care at [facility] follow up appointment(s): No future appointments. Non-[facility] follow up appointment(s): in process of establishing care with [facility] provider    Non-face-to-face services provided:  Obtained and reviewed discharge summary and/or continuity of care documents     Advance Care Planning:   Does patient have an Advance Directive:  not on file. Symptoms started: Friday 1/22/2021-     Patient has following risk factors of: recent COVID dx, HTN and recent ED visit. CTN/ACM reviewed discharge instructions, medical action plan and red flags such as increased shortness of breath, increasing fever and signs of decompensation with patient who verbalized understanding. Discussed exposure protocols and quarantine with CDC Guidelines What to do if you are sick with coronavirus disease 2019.  Patient was given an opportunity for questions and concerns. The patient agrees to contact the [facility] exposure line [phone], local health department [place] Department of Health: ([phone]) and PCP office for questions related to their healthcare. CTN/ACM provided contact information for future needs. Reviewed and educated patient on any new and changed medications related to discharge diagnosis     Patient/family/caregiver given information for [employer] and agrees to enroll no      Plan for follow-up call in 5-7 days based on severity of symptoms and risk factors. FU appts/Provider:    No future appointments.

## 2021-01-28 LAB — S PYO THROAT QL CULT: NORMAL

## 2021-02-02 ENCOUNTER — CARE COORDINATION (OUTPATIENT)
Dept: CARE COORDINATION | Age: 61
End: 2021-02-02

## 2021-02-02 NOTE — CARE COORDINATION
ED f/u call utilizing [employer] # Rin Tan [phone]    How are symptoms? Per pt, thank god no more symptoms\". How is status with establishing care at [facility]? Per pt, I haven't called. I was waiting on you to schedule the appt. ACM advised pt he will need to call [facility] to schedule appt. Pt voiced he will call tomorrow to schedule appt at [facility]. Pt asked since I have had COVID I was told I could not be vaccinated. [facility] offered pt number for [facility] () number clarified [phone] with pt. [facility] also encouraged pt to discuss with PCP at his appt he plans on schedule    [phone] BMV, ACM encouraged pt call due to questions about recently  licence and how to renew. Hours provided Mon-Fri 8am-5pm    Patient contacted regarding COVID-19 risk and screening. Discussed COVID-19 related testing which was available at this time. Test results were positive. Patient informed of results, if available? Previously discussed. Care Transition Nurse/ Ambulatory Care Manager contacted the patient by telephone to perform follow-up assessment. Verified name and  with patient as identifiers. Patient has following risk factors of: HTN, recent COVID dx and recent ED visit. Symptoms reviewed with patient who verbalized the following symptoms: no worsening symptoms. Due to no new or worsening symptoms encounter was not routed to provider for escalation. Education provided regarding infection prevention, and signs and symptoms of COVID-19 and when to seek medical attention with patient who verbalized understanding. Discussed exposure protocols and quarantine from [address] you at higher risk for severe illness  and given an opportunity for questions and concerns. The patient agrees to contact the COVID-19 hotline [phone] or PCP office for questions related to their healthcare. CTN/ACM provided contact information for future reference. From CDC: Are you at higher risk for severe illness?  Wash your hands often.  Avoid close contact (6 feet, which is about two arm lengths) with people who are sick.  Put distance between yourself and other people if COVID-19 is spreading in your community.  Clean and disinfect frequently touched surfaces.  Avoid all cruise travel and non-essential air travel.  Call your healthcare professional if you have concerns about COVID-19 and your underlying condition or if you are sick. For more information on steps you can take to protect yourself, see CDC's How to [place] for follow-up call in 5-7 days based on severity of symptoms and risk factors. FU appts/Provider:    No future appointments.

## 2021-02-04 ENCOUNTER — HOSPITAL ENCOUNTER (INPATIENT)
Age: 61
LOS: 8 days | Discharge: HOME | DRG: 871 | End: 2021-02-12
Attending: INTERNAL MEDICINE | Admitting: INTERNAL MEDICINE
Payer: MEDICARE

## 2021-02-04 ENCOUNTER — APPOINTMENT (OUTPATIENT)
Dept: GENERAL RADIOLOGY | Age: 61
DRG: 871 | End: 2021-02-04
Payer: MEDICARE

## 2021-02-04 DIAGNOSIS — R09.02 HYPOXIA: Primary | ICD-10-CM

## 2021-02-04 DIAGNOSIS — U07.1 COVID-19: ICD-10-CM

## 2021-02-04 DIAGNOSIS — I10 ESSENTIAL HYPERTENSION: ICD-10-CM

## 2021-02-04 DIAGNOSIS — R73.03 PREDIABETES: ICD-10-CM

## 2021-02-04 DIAGNOSIS — J18.9 PNEUMONIA DUE TO ORGANISM: ICD-10-CM

## 2021-02-04 PROBLEM — A41.89 SEPSIS DUE TO SEVERE ACUTE RESPIRATORY SYNDROME CORONAVIRUS 2 (SARS-COV-2) (HCC): Status: ACTIVE | Noted: 2021-02-04

## 2021-02-04 LAB
A/G RATIO: 0.9 (ref 1.1–2.2)
ABO/RH: NORMAL
ALBUMIN SERPL-MCNC: 3.4 G/DL (ref 3.4–5)
ALP BLD-CCNC: 418 U/L (ref 40–129)
ALT SERPL-CCNC: 132 U/L (ref 10–40)
ANION GAP SERPL CALCULATED.3IONS-SCNC: 16 MMOL/L (ref 3–16)
ANTIBODY SCREEN: NORMAL
AST SERPL-CCNC: 114 U/L (ref 15–37)
BASE EXCESS ARTERIAL: 1.3 MMOL/L (ref -3–3)
BASOPHILS ABSOLUTE: 0 K/UL (ref 0–0.2)
BASOPHILS RELATIVE PERCENT: 0.3 %
BILIRUB SERPL-MCNC: 0.7 MG/DL (ref 0–1)
BILIRUBIN URINE: NEGATIVE
BLOOD, URINE: NEGATIVE
BUN BLDV-MCNC: 17 MG/DL (ref 7–20)
C-REACTIVE PROTEIN: 286.8 MG/L (ref 0–5.1)
CALCIUM SERPL-MCNC: 9.3 MG/DL (ref 8.3–10.6)
CARBOXYHEMOGLOBIN ARTERIAL: 0 % (ref 0–1.5)
CHLORIDE BLD-SCNC: 100 MMOL/L (ref 99–110)
CLARITY: CLEAR
CO2: 21 MMOL/L (ref 21–32)
COLOR: YELLOW
CREAT SERPL-MCNC: 0.9 MG/DL (ref 0.8–1.3)
D DIMER: >5250 NG/ML DDU (ref 0–229)
EKG ATRIAL RATE: 62 BPM
EKG DIAGNOSIS: NORMAL
EKG P AXIS: 92 DEGREES
EKG P-R INTERVAL: 132 MS
EKG Q-T INTERVAL: 412 MS
EKG QRS DURATION: 88 MS
EKG QTC CALCULATION (BAZETT): 418 MS
EKG R AXIS: -10 DEGREES
EKG T AXIS: 134 DEGREES
EKG VENTRICULAR RATE: 62 BPM
EOSINOPHILS ABSOLUTE: 0 K/UL (ref 0–0.6)
EOSINOPHILS RELATIVE PERCENT: 0.2 %
EPITHELIAL CELLS, UA: 1 /HPF (ref 0–5)
FIBRINOGEN: 178 MG/DL (ref 200–397)
GFR AFRICAN AMERICAN: >60
GFR NON-AFRICAN AMERICAN: >60
GLOBULIN: 3.9 G/DL
GLUCOSE BLD-MCNC: 129 MG/DL (ref 70–99)
GLUCOSE BLD-MCNC: 149 MG/DL (ref 70–99)
GLUCOSE BLD-MCNC: 178 MG/DL (ref 70–99)
GLUCOSE URINE: NEGATIVE MG/DL
HCO3 ARTERIAL: 24.9 MMOL/L (ref 21–29)
HCT VFR BLD CALC: 44.3 % (ref 40.5–52.5)
HEMOGLOBIN, ART, EXTENDED: 14.5 G/DL (ref 13.5–17.5)
HEMOGLOBIN: 14.7 G/DL (ref 13.5–17.5)
HYALINE CASTS: 4 /LPF (ref 0–8)
KETONES, URINE: NEGATIVE MG/DL
LACTIC ACID, SEPSIS: 2.3 MMOL/L (ref 0.4–1.9)
LACTIC ACID, SEPSIS: 3.4 MMOL/L (ref 0.4–1.9)
LEUKOCYTE ESTERASE, URINE: NEGATIVE
LIPASE: 23 U/L (ref 13–60)
LYMPHOCYTES ABSOLUTE: 1.2 K/UL (ref 1–5.1)
LYMPHOCYTES RELATIVE PERCENT: 7.8 %
MCH RBC QN AUTO: 30.9 PG (ref 26–34)
MCHC RBC AUTO-ENTMCNC: 33.3 G/DL (ref 31–36)
MCV RBC AUTO: 92.7 FL (ref 80–100)
METHEMOGLOBIN ARTERIAL: 0.6 %
MICROSCOPIC EXAMINATION: YES
MONOCYTES ABSOLUTE: 0.5 K/UL (ref 0–1.3)
MONOCYTES RELATIVE PERCENT: 3.5 %
NEUTROPHILS ABSOLUTE: 13 K/UL (ref 1.7–7.7)
NEUTROPHILS RELATIVE PERCENT: 88.2 %
NITRITE, URINE: NEGATIVE
O2 CONTENT ARTERIAL: 19 ML/DL
O2 SAT, ARTERIAL: 91 %
O2 THERAPY: ABNORMAL
PCO2 ARTERIAL: 35.4 MMHG (ref 35–45)
PDW BLD-RTO: 14.5 % (ref 12.4–15.4)
PERFORMED ON: ABNORMAL
PERFORMED ON: ABNORMAL
PH ARTERIAL: 7.46 (ref 7.35–7.45)
PH UA: 6.5 (ref 5–8)
PLATELET # BLD: 173 K/UL (ref 135–450)
PMV BLD AUTO: 8.9 FL (ref 5–10.5)
PO2 ARTERIAL: 62.6 MMHG (ref 75–108)
POTASSIUM REFLEX MAGNESIUM: 4 MMOL/L (ref 3.5–5.1)
PRO-BNP: 201 PG/ML (ref 0–124)
PROCALCITONIN: 0.19 NG/ML (ref 0–0.15)
PROTEIN UA: 100 MG/DL
RBC # BLD: 4.78 M/UL (ref 4.2–5.9)
RBC UA: 3 /HPF (ref 0–4)
REASON FOR REJECTION: NORMAL
REJECTED TEST: NORMAL
SODIUM BLD-SCNC: 137 MMOL/L (ref 136–145)
SPECIFIC GRAVITY UA: 1.02 (ref 1–1.03)
TCO2 ARTERIAL: 26 MMOL/L
TOTAL PROTEIN: 7.3 G/DL (ref 6.4–8.2)
TROPONIN: <0.01 NG/ML
URINE REFLEX TO CULTURE: ABNORMAL
URINE TYPE: ABNORMAL
UROBILINOGEN, URINE: 1 E.U./DL
VITAMIN D 25-HYDROXY: 26.9 NG/ML
WBC # BLD: 14.7 K/UL (ref 4–11)
WBC UA: 1 /HPF (ref 0–5)

## 2021-02-04 PROCEDURE — 96375 TX/PRO/DX INJ NEW DRUG ADDON: CPT

## 2021-02-04 PROCEDURE — 2060000000 HC ICU INTERMEDIATE R&B

## 2021-02-04 PROCEDURE — 83605 ASSAY OF LACTIC ACID: CPT

## 2021-02-04 PROCEDURE — 96374 THER/PROPH/DIAG INJ IV PUSH: CPT

## 2021-02-04 PROCEDURE — 2500000003 HC RX 250 WO HCPCS

## 2021-02-04 PROCEDURE — 82803 BLOOD GASES ANY COMBINATION: CPT

## 2021-02-04 PROCEDURE — 82306 VITAMIN D 25 HYDROXY: CPT

## 2021-02-04 PROCEDURE — 36415 COLL VENOUS BLD VENIPUNCTURE: CPT

## 2021-02-04 PROCEDURE — 83880 ASSAY OF NATRIURETIC PEPTIDE: CPT

## 2021-02-04 PROCEDURE — 2580000003 HC RX 258

## 2021-02-04 PROCEDURE — 2700000000 HC OXYGEN THERAPY PER DAY

## 2021-02-04 PROCEDURE — 2580000003 HC RX 258: Performed by: INTERNAL MEDICINE

## 2021-02-04 PROCEDURE — 86769 SARS-COV-2 COVID-19 ANTIBODY: CPT

## 2021-02-04 PROCEDURE — 87040 BLOOD CULTURE FOR BACTERIA: CPT

## 2021-02-04 PROCEDURE — 71045 X-RAY EXAM CHEST 1 VIEW: CPT

## 2021-02-04 PROCEDURE — 2580000003 HC RX 258: Performed by: NURSE PRACTITIONER

## 2021-02-04 PROCEDURE — 36600 WITHDRAWAL OF ARTERIAL BLOOD: CPT

## 2021-02-04 PROCEDURE — 81001 URINALYSIS AUTO W/SCOPE: CPT

## 2021-02-04 PROCEDURE — 80053 COMPREHEN METABOLIC PANEL: CPT

## 2021-02-04 PROCEDURE — 85025 COMPLETE CBC W/AUTO DIFF WBC: CPT

## 2021-02-04 PROCEDURE — 93005 ELECTROCARDIOGRAM TRACING: CPT | Performed by: NURSE PRACTITIONER

## 2021-02-04 PROCEDURE — XW033E5 INTRODUCTION OF REMDESIVIR ANTI-INFECTIVE INTO PERIPHERAL VEIN, PERCUTANEOUS APPROACH, NEW TECHNOLOGY GROUP 5: ICD-10-PCS | Performed by: INTERNAL MEDICINE

## 2021-02-04 PROCEDURE — 86901 BLOOD TYPING SEROLOGIC RH(D): CPT

## 2021-02-04 PROCEDURE — 86850 RBC ANTIBODY SCREEN: CPT

## 2021-02-04 PROCEDURE — 6360000002 HC RX W HCPCS: Performed by: INTERNAL MEDICINE

## 2021-02-04 PROCEDURE — 86140 C-REACTIVE PROTEIN: CPT

## 2021-02-04 PROCEDURE — 84484 ASSAY OF TROPONIN QUANT: CPT

## 2021-02-04 PROCEDURE — 85384 FIBRINOGEN ACTIVITY: CPT

## 2021-02-04 PROCEDURE — 94761 N-INVAS EAR/PLS OXIMETRY MLT: CPT

## 2021-02-04 PROCEDURE — 6370000000 HC RX 637 (ALT 250 FOR IP): Performed by: INTERNAL MEDICINE

## 2021-02-04 PROCEDURE — 84145 PROCALCITONIN (PCT): CPT

## 2021-02-04 PROCEDURE — 87449 NOS EACH ORGANISM AG IA: CPT

## 2021-02-04 PROCEDURE — 83690 ASSAY OF LIPASE: CPT

## 2021-02-04 PROCEDURE — 6360000002 HC RX W HCPCS: Performed by: NURSE PRACTITIONER

## 2021-02-04 PROCEDURE — 86900 BLOOD TYPING SEROLOGIC ABO: CPT

## 2021-02-04 PROCEDURE — 85379 FIBRIN DEGRADATION QUANT: CPT

## 2021-02-04 PROCEDURE — 93010 ELECTROCARDIOGRAM REPORT: CPT | Performed by: INTERNAL MEDICINE

## 2021-02-04 PROCEDURE — 99285 EMERGENCY DEPT VISIT HI MDM: CPT

## 2021-02-04 RX ORDER — 0.9 % SODIUM CHLORIDE 0.9 %
30 INTRAVENOUS SOLUTION INTRAVENOUS PRN
Status: DISCONTINUED | OUTPATIENT
Start: 2021-02-04 | End: 2021-02-12 | Stop reason: HOSPADM

## 2021-02-04 RX ORDER — ACETAMINOPHEN 650 MG/1
650 SUPPOSITORY RECTAL EVERY 6 HOURS PRN
Status: DISCONTINUED | OUTPATIENT
Start: 2021-02-04 | End: 2021-02-12 | Stop reason: HOSPADM

## 2021-02-04 RX ORDER — IVERMECTIN 3 MG/1
200 TABLET ORAL ONCE
Status: COMPLETED | OUTPATIENT
Start: 2021-02-04 | End: 2021-02-04

## 2021-02-04 RX ORDER — NICOTINE POLACRILEX 4 MG
15 LOZENGE BUCCAL PRN
Status: DISCONTINUED | OUTPATIENT
Start: 2021-02-04 | End: 2021-02-12 | Stop reason: HOSPADM

## 2021-02-04 RX ORDER — SODIUM CHLORIDE 0.9 % (FLUSH) 0.9 %
10 SYRINGE (ML) INJECTION PRN
Status: DISCONTINUED | OUTPATIENT
Start: 2021-02-04 | End: 2021-02-12 | Stop reason: HOSPADM

## 2021-02-04 RX ORDER — ACETAMINOPHEN 650 MG/1
650 SUPPOSITORY RECTAL EVERY 6 HOURS PRN
Status: DISCONTINUED | OUTPATIENT
Start: 2021-02-04 | End: 2021-02-04

## 2021-02-04 RX ORDER — DEXTROSE MONOHYDRATE 50 MG/ML
100 INJECTION, SOLUTION INTRAVENOUS PRN
Status: DISCONTINUED | OUTPATIENT
Start: 2021-02-04 | End: 2021-02-12 | Stop reason: HOSPADM

## 2021-02-04 RX ORDER — VITAMIN B COMPLEX
6000 TABLET ORAL DAILY
Status: DISPENSED | OUTPATIENT
Start: 2021-02-04 | End: 2021-02-11

## 2021-02-04 RX ORDER — ACETAMINOPHEN 325 MG/1
650 TABLET ORAL EVERY 6 HOURS PRN
Status: DISCONTINUED | OUTPATIENT
Start: 2021-02-04 | End: 2021-02-05 | Stop reason: SDUPTHER

## 2021-02-04 RX ORDER — DEXAMETHASONE 6 MG/1
6 TABLET ORAL DAILY
Status: DISCONTINUED | OUTPATIENT
Start: 2021-02-05 | End: 2021-02-05

## 2021-02-04 RX ORDER — DEXTROSE MONOHYDRATE 25 G/50ML
12.5 INJECTION, SOLUTION INTRAVENOUS PRN
Status: DISCONTINUED | OUTPATIENT
Start: 2021-02-04 | End: 2021-02-12 | Stop reason: HOSPADM

## 2021-02-04 RX ORDER — ATORVASTATIN CALCIUM 20 MG/1
20 TABLET, FILM COATED ORAL DAILY
Status: DISCONTINUED | OUTPATIENT
Start: 2021-02-04 | End: 2021-02-04

## 2021-02-04 RX ORDER — ACETAMINOPHEN 650 MG/1
650 SUPPOSITORY RECTAL EVERY 6 HOURS PRN
Status: DISCONTINUED | OUTPATIENT
Start: 2021-02-04 | End: 2021-02-05 | Stop reason: SDUPTHER

## 2021-02-04 RX ORDER — DEXAMETHASONE SODIUM PHOSPHATE 10 MG/ML
10 INJECTION, SOLUTION INTRAMUSCULAR; INTRAVENOUS ONCE
Status: COMPLETED | OUTPATIENT
Start: 2021-02-04 | End: 2021-02-04

## 2021-02-04 RX ORDER — ALLOPURINOL 100 MG/1
200 TABLET ORAL DAILY
Status: DISCONTINUED | OUTPATIENT
Start: 2021-02-04 | End: 2021-02-04

## 2021-02-04 RX ORDER — VITAMIN B COMPLEX
2000 TABLET ORAL DAILY
Status: DISCONTINUED | OUTPATIENT
Start: 2021-02-11 | End: 2021-02-12 | Stop reason: HOSPADM

## 2021-02-04 RX ORDER — ACETAMINOPHEN 325 MG/1
650 TABLET ORAL EVERY 6 HOURS PRN
Status: DISCONTINUED | OUTPATIENT
Start: 2021-02-04 | End: 2021-02-04

## 2021-02-04 RX ORDER — GUAIFENESIN/DEXTROMETHORPHAN 100-10MG/5
5 SYRUP ORAL EVERY 4 HOURS PRN
Status: DISCONTINUED | OUTPATIENT
Start: 2021-02-04 | End: 2021-02-12 | Stop reason: HOSPADM

## 2021-02-04 RX ORDER — SODIUM CHLORIDE 0.9 % (FLUSH) 0.9 %
10 SYRINGE (ML) INJECTION EVERY 12 HOURS SCHEDULED
Status: DISCONTINUED | OUTPATIENT
Start: 2021-02-04 | End: 2021-02-12 | Stop reason: HOSPADM

## 2021-02-04 RX ORDER — 0.9 % SODIUM CHLORIDE 0.9 %
30 INTRAVENOUS SOLUTION INTRAVENOUS ONCE
Status: COMPLETED | OUTPATIENT
Start: 2021-02-04 | End: 2021-02-04

## 2021-02-04 RX ORDER — ACETAMINOPHEN 325 MG/1
650 TABLET ORAL EVERY 6 HOURS PRN
Status: DISCONTINUED | OUTPATIENT
Start: 2021-02-04 | End: 2021-02-12 | Stop reason: HOSPADM

## 2021-02-04 RX ADMIN — INSULIN LISPRO 1 UNITS: 100 INJECTION, SOLUTION INTRAVENOUS; SUBCUTANEOUS at 18:54

## 2021-02-04 RX ADMIN — ENOXAPARIN SODIUM 30 MG: 30 INJECTION SUBCUTANEOUS at 21:32

## 2021-02-04 RX ADMIN — IVERMECTIN 16.5 MG: 3 TABLET ORAL at 21:33

## 2021-02-04 RX ADMIN — AZITHROMYCIN MONOHYDRATE 500 MG: 500 INJECTION, POWDER, LYOPHILIZED, FOR SOLUTION INTRAVENOUS at 17:51

## 2021-02-04 RX ADMIN — DEXAMETHASONE SODIUM PHOSPHATE 10 MG: 10 INJECTION, SOLUTION INTRAMUSCULAR; INTRAVENOUS at 15:38

## 2021-02-04 RX ADMIN — ACETAMINOPHEN 650 MG: 325 TABLET ORAL at 21:37

## 2021-02-04 RX ADMIN — REMDESIVIR 200 MG: 100 INJECTION, POWDER, LYOPHILIZED, FOR SOLUTION INTRAVENOUS at 18:52

## 2021-02-04 RX ADMIN — CEFTRIAXONE 1000 MG: 1 INJECTION, POWDER, FOR SOLUTION INTRAMUSCULAR; INTRAVENOUS at 16:30

## 2021-02-04 RX ADMIN — SODIUM CHLORIDE, PRESERVATIVE FREE 10 ML: 5 INJECTION INTRAVENOUS at 21:34

## 2021-02-04 RX ADMIN — SODIUM CHLORIDE 1983 ML: 9 INJECTION, SOLUTION INTRAVENOUS at 16:30

## 2021-02-04 RX ADMIN — INSULIN LISPRO 1 UNITS: 100 INJECTION, SOLUTION INTRAVENOUS; SUBCUTANEOUS at 21:32

## 2021-02-04 RX ADMIN — Medication 6000 UNITS: at 18:52

## 2021-02-04 ASSESSMENT — PAIN - FUNCTIONAL ASSESSMENT: PAIN_FUNCTIONAL_ASSESSMENT: ACTIVITIES ARE NOT PREVENTED

## 2021-02-04 ASSESSMENT — PAIN DESCRIPTION - FREQUENCY: FREQUENCY: INTERMITTENT

## 2021-02-04 ASSESSMENT — PAIN SCALES - GENERAL: PAINLEVEL_OUTOF10: 2

## 2021-02-04 ASSESSMENT — PAIN DESCRIPTION - LOCATION: LOCATION: HEAD

## 2021-02-04 ASSESSMENT — PAIN DESCRIPTION - PAIN TYPE: TYPE: ACUTE PAIN

## 2021-02-04 ASSESSMENT — PAIN DESCRIPTION - DESCRIPTORS: DESCRIPTORS: ACHING

## 2021-02-04 NOTE — H&P
Hospital Medicine History & Physical      PCP: No primary care provider on file. Date of Admission: 2/4/2021    Date of Service: Pt seen/examined on 02/04/2021 and Admitted to Inpatient with expected LOS greater than two midnights due to medical therapy. Chief Complaint:  Shortness of breath      History Of Present Illness:    62 y.o. male Yogesh Godfrey with past medical hypertension, prediabetes  Presents with shortness of breath, 20 coughing due to COVID-19. History obtained using  iPad  He had a sore throat last week, got COVID-19 tested on January 26 which came back positive. He has been feeling well until 3 days ago when he started having shortness of breath, progressively worse, now only to take 10-15 steps before needing rest and getting significantly winded. He complains of lower neck pain on deep inspiration. Does have a mild headache intermittently. Denies nausea vomiting. Appetite has been good. In the ED placed on monitor found to 59% on room air, initially placed on nasal cannula but quickly went to nonrebreather oxygen saturation. Once settled able to wean him off to 6 L nasal cannula oxygen. VBG shows alkalemia, not hypercarbic. Other labs with lactic acid 3.4, procalcitonin 0.19, transaminitis , . Leukocytosis 14.7 elevated ANC. X-ray shows multifocal pneumonia. Patient admitted to hospital for acute respiratory failure with hypoxemia with sepsis due to COVID-19 pneumonia and possibly superimposed bacterial pneumonia. Past Medical History:          Diagnosis Date    Gout     Hypertension 02/12/2018    Prediabetes 02/12/2018       Past Surgical History:          Procedure Laterality Date    SHOULDER SURGERY  2014       Medications Prior to Admission:      Prior to Admission medications    Medication Sig Start Date End Date Taking?  Authorizing Provider   indomethacin (INDOCIN) 50 MG capsule Take 1 capsule by mouth 2 times daily (with meals) 7/3/20   ADAM Zimmer   atorvastatin (LIPITOR) 20 MG tablet Take 1 tablet by mouth daily 3/12/19   CRISTOBAL Tate CNP   metFORMIN (GLUCOPHAGE) 500 MG tablet Take 1 tablet by mouth 2 times daily (with meals) 3/12/19   CRISTOBAL Tate CNP   lisinopril (PRINIVIL;ZESTRIL) 20 MG tablet Take 1 tablet by mouth daily 2/26/19   CRISTOBAL Tate CNP   Omega-3 Fatty Acids (OMEGA-3 FISH OIL) 1000 MG CAPS Take 1 capsule by mouth daily 2/26/19   CRISTOBAL Tate CNP   allopurinol (ZYLOPRIM) 100 MG tablet Take 2 tablets by mouth daily 2/26/19   CRISTOBAL Tate CNP       Allergies:  Patient has no known allergies. Social History:      The patient currently lives home    TOBACCO:   reports that he has never smoked. He has never used smokeless tobacco.  ETOH:   reports no history of alcohol use. Family History:      Reviewed    History reviewed. No pertinent family history. REVIEW OF SYSTEMS:   Pertinent positives as noted in the HPI. All other systems reviewed and negative. PHYSICAL EXAM PERFORMED:    /69   Pulse 66   Temp 97.7 °F (36.5 °C) (Temporal)   Resp 30   Ht 5' 7\" (1.702 m)   Wt 176 lb 9.4 oz (80.1 kg)   SpO2 90%   BMI 27.66 kg/m²     General appearance: Ill-appearing, gets short of breath while talking  HEENT:  Normal cephalic, atraumatic without obvious deformity. Pupils equal, round, and reactive to light. Extra ocular muscles intact. Conjunctivae/corneas clear. Neck: Supple, with full range of motion. No jugular venous distention. Trachea midline. Respiratory: Respiratory effort at rest but gets winded with talking in sentences and minimal movement in bed. Some crackles appreciated but otherwise clear to auscultation  Cardiovascular:  Regular rate and rhythm with normal S1/S2 without murmurs, rubs or gallops. Abdomen: Soft, non-tender, non-distended with normal bowel sounds. Musculoskeletal:  No clubbing, cyanosis or edema bilaterally.   Full range of motion without deformity. Skin: Skin color, texture, turgor normal.  No rashes or lesions. Neurologic:  Neurovascularly intact without any focal sensory/motor deficits. Cranial nerves: II-XII intact, grossly non-focal.  Psychiatric:  Alert and oriented, thought content appropriate, normal insight  Capillary Refill: Brisk,< 3 seconds   Peripheral Pulses: +2 palpable, equal bilaterally       Labs:     Recent Labs     02/04/21  1538   WBC 14.7*   HGB 14.7   HCT 44.3        Recent Labs     02/04/21  1453      K 4.0      CO2 21   BUN 17   CREATININE 0.9   CALCIUM 9.3     Recent Labs     02/04/21  1453   *   *   BILITOT 0.7   ALKPHOS 418*     No results for input(s): INR in the last 72 hours.   Recent Labs     02/04/21  1453   TROPONINI <0.01       Urinalysis:      Lab Results   Component Value Date    BLOODU NEG 03/14/2019    SPECGRAV 1.030 03/14/2019    GLUCOSEU NEG 03/14/2019       Radiology:     CXR: I have reviewed the CXR with the following interpretation: Bilateral diffuse airspace disease concerning consistent with COVID-19 pneumonia  EKG:  I have reviewed the EKG with the following interpretation:   Normal sinus rhythm normal axis normal intervals, nonspecific ST changes with T wave inversions in V4 to V6 no significant change from prior EKG in 2/2018    XR CHEST PORTABLE   Final Result   Bilateral airspace opacities could represent pulmonary edema, multifocal   bacterial pneumonia or atypical pneumonia             ASSESSMENT:    Active Hospital Problems    Diagnosis Date Noted    Sepsis due to severe acute respiratory syndrome coronavirus 2 (SARS-CoV-2) ([facility]) [U07.1, A41.89] 02/04/2021     Sepsis due to COVID-19 pneumonia  Acute respiratory failure with hypoxemia due to COVID-19, 59% on room air on arrival to the ED, needed 15 L nonrebreather to maintain oxygen saturation then weaned to 6 L high flow  Started of a sore throat last week but has been short of breath for the last few days now  Rule out influenza with rapid test  Trend procalcitonin, if procalcitonin trend negative can DC Rocephin and continue only azithromycin for atypical coverage  Check and trend inflammatory markers  Received sepsis protocol fluids in the emergency room  Concerned about the possibility of a pulmonary embolism especially since he got better and then he started worsening of shortness of breath. Will check D-dimer if elevated will get a CTPA rule out acute PE  Avoid nebulizer treatments, use metered-dose inhalers  Prone position as much as possible  Start Vitamin D 6000 units daily  Start Zn sulfate 50 mg OD  Decadron 6 mg x 10 days  Give dose of ivermectin 200 mcg/kg x 1  Lab Results   Component Value Date    CREATININE 0.9 02/04/2021    BUN 17 02/04/2021     02/04/2021    K 4.0 02/04/2021     02/04/2021    CO2 21 02/04/2021     Remdesivir Initiation Note (first dose 2/04)    Aleyda Luna meets criteria for initiation of remdesivir under the emergency use authorization (EUA) based on the following:   Known or suspected COVID-19   Severe disease (SpO2 ? 94% on RA, requiring supplemental O2, or requiring invasive mechanical ventilation)   Acceptable renal function  o CrCl ? 30 ml/min based on SCr obtained prior to initiation OR   o CrCl < 30 ml/min but the potential benefit of remdesivir outweighs the risk   Acceptable hepatic function (ALT within 5 times ULN)    I have discussed with the patient/proxy information consistent with the Fact Sheet for Patients and Parents/Caregivers\" and the patient/proxy has agreed to initiating remdesivir after being:   Given the Fact Sheet for Patients and Parents/Caregivers   Informed of the alternatives to receiving remdesivir, and    Informed that remdesivir is an unapproved drug that is authorized for use under EUA    Liver function tests will be monitored daily while on remdesivir.   Send type and screen for possible convalescent plasma rx  Monitor O2 sats, place O2 to maintain for SpO2 > 90%  If Severe respiratory failure and need for noninvasive positive pressure ventilation, need negative pressure room as is more likely causes aerosolized secretions (BiPaP, CPAP, HiFlo NC). N95 with proper fitting for aerosolized procedures  Personal protective equipment including gown gloves surgical mask, eye protection  Monitor closely, if worse then will consider ID/Pulmonary evaluation    Hypertension  Continue hold blood pressure medications for now    Prediabetes  We will recheck A1c  Low-dose sliding scale insulin  Hypoglycemia protocol  Point-of-care glucose test    DVT Prophylaxis: Lovenox  Diet: DIET CARB CONTROL; Carb Control: 4 carb choices (60 gms)/meal  Code Status: Full Code (long discussion about CODE STATUS, he was initially very concerned of going on the ventilator and refused but later he says that he would like all aggressive care and will let God help him. And admit is unable to make medical decisions he wants his eldest son to be the decision maker)    PT/OT Eval Status: order once acute issues resolved    [address] [facility] as inpatient. I anticipate hospitalization spanning more than two midnights for investigation and treatment of the above medically necessary diagnoses. Total critical care time spent not including procedures 55 minutes       Lino Brooks MD    Thank you No primary care provider on file. for the opportunity to be involved in this patient's care. If you have any questions or concerns please feel free to contact me at [phone].

## 2021-02-04 NOTE — ED NOTES
Bed: A-16  Expected date:   Expected time:   Means of arrival:   Comments:  #1     Marsha Nava RN  02/04/21 [id number]

## 2021-02-04 NOTE — ED PROVIDER NOTES
Per my interpretation:    Electrocardiogram (ECG) 2/4/2021 1438  RATE: normal  RHYTHM: normal sinus  AXIS: normal  INTERVALS: normal  ST-T WAVE CHANGES: No evidence of ST segment elevation, nonspecific ST changes including Q waves in inferior leads, and T wave versions V4 through V6, not significantly changed from prior  Prior for comparison 2/12/2018     Michael Trinh MD  02/04/21 3901

## 2021-02-04 NOTE — PROGRESS NOTES
Pt arrived to room 5252 from ED. Pt is alert and oriented in bed. VSS. Call light within reach of patient. Will continue to monitor.      Electronically signed by Kim Moraes RN on 2/4/2021 at 6:51 PM

## 2021-02-04 NOTE — ED PROVIDER NOTES
[address] [facility] [address]  [address] [address]  Dept: [phone]  Loc: [address] ENCOUNTER        This patient was not seen or evaluated by the attending physician. I evaluated this patient, the attending physician was available for consultation. CHIEF COMPLAINT    Chief Complaint   Patient presents with    Positive For Covid-19     c/o being SOB, denies any other symptoms    Shortness of Breath       HPI    Mari Davila is a 62 y.o. male who presents with shortness of breath and mild intermittent cough due to Covid-19. He was diagnosed on January 26. He states that it is mild in nature. However when he was placed on the monitor he was 59% on room air. He denies any chest pain, nausea vomiting diarrhea or abdominal pain. States that \"just have a mild cough and I feel some shortness of breath but otherwise okay. \"  Cough is intermittently productive with thick yellow sputum. He does have a history of diabetes and hypertension but otherwise no previous history of COPD or CHF. Symptoms alleviate mostly with rest.  He is here with his son for further evaluation and treatment. REVIEW OF SYSTEMS    Cardiac: no chest pain, no palpitations, no syncope  Respiratory: see HPI, +intermittent cough  Neurologic: no syncope or LOC  GI: no vomiting, no diarrhea, no abdominal pain  General: no measured fever  All other systems reviewed and are negative.     PAST MEDICAL & SURGICAL HISTORY    Past Medical History:   Diagnosis Date    Gout     Hypertension 02/12/2018    Prediabetes 02/12/2018     Past Surgical History:   Procedure Laterality Date    SHOULDER SURGERY  2014       CURRENT MEDICATIONS  (may include discharge medications prescribed in the ED)  Current Outpatient Rx   Medication Sig Dispense Refill    indomethacin (INDOCIN) 50 MG capsule Take 1 capsule by mouth 2 times daily (with meals) 60 capsule 0  atorvastatin (LIPITOR) 20 MG tablet Take 1 tablet by mouth daily 30 tablet 2    metFORMIN (GLUCOPHAGE) 500 MG tablet Take 1 tablet by mouth 2 times daily (with meals) 60 tablet 2    lisinopril (PRINIVIL;ZESTRIL) 20 MG tablet Take 1 tablet by mouth daily 60 tablet 1    Omega-3 Fatty Acids (OMEGA-3 FISH OIL) 1000 MG CAPS Take 1 capsule by mouth daily 90 capsule 2    allopurinol (ZYLOPRIM) 100 MG tablet Take 2 tablets by mouth daily 30 tablet 3       ALLERGIES    No Known Allergies    SOCIAL & FAMILY HISTORY    Social History     Socioeconomic History    Marital status: Single     Spouse name: None    Number of children: None    Years of education: None    Highest education level: None   Occupational History    None   Social Needs    Financial resource strain: None    Food insecurity     Worry: None     Inability: None    Transportation needs     Medical: None     Non-medical: None   Tobacco Use    Smoking status: Never Smoker    Smokeless tobacco: Never Used   Substance and Sexual Activity    Alcohol use: No    Drug use: No    Sexual activity: Yes     Partners: Female   Lifestyle    Physical activity     Days per week: None     Minutes per session: None    Stress: None   Relationships    Social connections     Talks on phone: None     Gets together: None     Attends Islam service: None     Active member of club or organization: None     Attends meetings of clubs or organizations: None     Relationship status: None    Intimate partner violence     Fear of current or ex partner: None     Emotionally abused: None     Physically abused: None     Forced sexual activity: None   Other Topics Concern    None   Social History Narrative    None     History reviewed. No pertinent family history.     PHYSICAL EXAM    VITAL SIGNS: /69   Pulse 64   Temp 97.7 °F (36.5 °C) (Temporal)   Resp 27   Ht 5' 7\" (1.702 m)   Wt 176 lb 9.4 oz (80.1 kg)   SpO2 92%   BMI 27.66 kg/m²    Constitutional: Well developed, well nourished, tachypneic with a O2 sat of 59% on room air. He is now satting 90 to 95% on 6 L high flow nasal cannula  HENT: Normocephalic, Atraumatic, moist mucus membranes  Neck: supple, no JVD   Respiratory:  Lungs do have rhonchorous lung sounds in bilateral lower bases, no expiratory wheezes, no retractions, no accessory muscle use. Is tachypneic with any exertion  Cardiovascular:  regular rate, no murmurs, rubs or gallops  Vascular: Radial and DP pulses 2+ and equal bilaterally  GI:  Soft, nontender, normal bowel sounds  Musculoskeletal:  no lower extremity edema, no lower extremity asymmetry, no calf tenderness, no thigh tenderness, no acute deformities  Integument:  Skin is warm and dry, no petechiae   Neurologic:  Alert & oriented, no slurred speech  Psych: Pleasant affect, no hallucinations      EKG    Please see the physician note for EKG interpretation.     LABS  Results for orders placed or performed during the hospital encounter of 02/04/21   Comprehensive Metabolic Panel w/ Reflex to MG   Result Value Ref Range    Sodium 137 136 - 145 mmol/L    Potassium reflex Magnesium 4.0 3.5 - 5.1 mmol/L    Chloride 100 99 - 110 mmol/L    CO2 21 21 - 32 mmol/L    Anion Gap 16 3 - 16    Glucose 129 (H) 70 - 99 mg/dL    BUN 17 7 - 20 mg/dL    CREATININE 0.9 0.8 - 1.3 mg/dL    GFR Non-African American >60 >60    GFR African American >60 >60    Calcium 9.3 8.3 - 10.6 mg/dL    Total Protein 7.3 6.4 - 8.2 g/dL    Albumin 3.4 3.4 - 5.0 g/dL    Albumin/Globulin Ratio 0.9 (L) 1.1 - 2.2    Total Bilirubin 0.7 0.0 - 1.0 mg/dL    Alkaline Phosphatase 418 (H) 40 - 129 U/L     (H) 10 - 40 U/L     (H) 15 - 37 U/L    Globulin 3.9 g/dL   Lipase   Result Value Ref Range    Lipase 23.0 13.0 - 60.0 U/L   Troponin   Result Value Ref Range    Troponin <0.01 <0.01 ng/mL   Brain Natriuretic Peptide   Result Value Ref Range    Pro- (H) 0 - 124 pg/mL   Blood Gas, Arterial   Result Value Ref Range pH, Arterial 7.455 (H) 7.350 - 7.450    pCO2, Arterial 35.4 35.0 - 45.0 mmHg    pO2, Arterial 62.6 (L) 75.0 - 108.0 mmHg    HCO3, Arterial 24.9 21.0 - 29.0 mmol/L    Base Excess, Arterial 1.3 -3.0 - 3.0 mmol/L    Hemoglobin, Art, Extended 14.5 13.5 - 17.5 g/dL    O2 Sat, Arterial 91.0 (L) >92 %    Carboxyhgb, Arterial 0.0 0.0 - 1.5 %    Methemoglobin, Arterial 0.6 <1.5 %    TCO2, Arterial 26.0 Not Established mmol/L    O2 Content, Arterial 19 Not Established mL/dL    O2 Therapy See comment    Lactate, Sepsis   Result Value Ref Range    Lactic Acid, Sepsis 3.4 (H) 0.4 - 1.9 mmol/L   Procalcitonin   Result Value Ref Range    Procalcitonin 0.19 (H) 0.00 - 0.15 ng/mL   SPECIMEN REJECTION   Result Value Ref Range    Rejected Test CBCWD     Reason for Rejection see below    CBC Auto Differential   Result Value Ref Range    WBC 14.7 (H) 4.0 - 11.0 K/uL    RBC 4.78 4.20 - 5.90 M/uL    Hemoglobin 14.7 13.5 - 17.5 g/dL    Hematocrit 44.3 40.5 - 52.5 %    MCV 92.7 80.0 - 100.0 fL    MCH 30.9 26.0 - 34.0 pg    MCHC 33.3 31.0 - 36.0 g/dL    RDW 14.5 12.4 - 15.4 %    Platelets 851 133 - 732 K/uL    MPV 8.9 5.0 - 10.5 fL    Neutrophils % 88.2 %    Lymphocytes % 7.8 %    Monocytes % 3.5 %    Eosinophils % 0.2 %    Basophils % 0.3 %    Neutrophils Absolute 13.0 (H) 1.7 - 7.7 K/uL    Lymphocytes Absolute 1.2 1.0 - 5.1 K/uL    Monocytes Absolute 0.5 0.0 - 1.3 K/uL    Eosinophils Absolute 0.0 0.0 - 0.6 K/uL    Basophils Absolute 0.0 0.0 - 0.2 K/uL   EKG 12 Lead   Result Value Ref Range    Ventricular Rate 62 BPM    Atrial Rate 62 BPM    P-R Interval 132 ms    QRS Duration 88 ms    Q-T Interval 412 ms    QTc Calculation (Bazett) 418 ms    P Axis 92 degrees    R Axis -10 degrees    T Axis 134 degrees    Diagnosis       Normal sinus rhythmCannot rule out Inferior infarct , age undeterminedAnterolateral infarct , age undeterminedAbnormal ECGNo previous ECGs available        RADIOLOGY/PROCEDURES    XR CHEST PORTABLE   Final Result Bilateral airspace opacities could represent pulmonary edema, multifocal   bacterial pneumonia or atypical pneumonia             ED COURSE & MEDICAL DECISION MAKING    Pertinent Labs & Imaging studies reviewed and interpreted. (See chart for details)    See chart for details of medications given during the ED stay. Vitals:    02/04/21 1536 02/04/21 1606 02/04/21 1623 02/04/21 1629   BP: 139/82 139/69     Pulse: 66 66 60 64   Resp: 24 30 24 27   Temp:       TempSrc:       SpO2: 94% 90% (!) 89% 92%   Weight:       Height:           Differential Diagnosis: Acute Coronary Syndrome, Congestive Heart Failure, Myocardial Infarction, Pulmonary Embolus, Pneumonia, Pneumothorax, COVID-19, Influenza, other    CRITICAL CARE NOTE:  There was a high probability of clinically significant life-threatening deterioration of the patient's condition requiring my urgent intervention. Total critical care time was at least 45 minutes. This includes vital sign monitoring, pulse oximetry monitoring, telemetry monitoring, clinical response to the IV medications, reviewing the nursing notes, consultation time, dictation/documentation time, and interpretation of the labwork. This excludes any separately billable procedures performed. Patient is afebrile but arrived tachypneic. He was satting 59% on room air. Placed on a nonrebreather nasal cannula, however quickly rebounded to 100% on both the nonrebreather and nasal cannula. Therefore nonrebreather was removed. Patient is stable satting 92 to 94% on 6 L high flow nasal cannula. Labs reveal a leukocytosis of 14.7, no anemia. Lactic acidosis of 3.4. Also has an elevated pro-Kevin at 0.19. Therefore her blood cultures were drawn and sent for processing. Metabolic panel shows no severe electrolyte derangements or LEE. There is a slight transaminitis with a alk phos of 418,  and AST of 114. No elevation in his total bilirubin or lipase.      ABG shows a pH of 7.455 and a PaO2 of 62.6. He is not retaining CO2 however with a CO2 level of 35.4. Bicarb is normal at 24.9. Otherwise unremarkable ABG. Electing not to place the patient on BiPAP. His satting well on 6 L high flow nasal cannula. He was given Decadron and IV antibiotics. Given that he is not retaining CO2 and has no mental status changes I am    CXR findings as above. EKG interpreted by physician. Troponin negative. Given patient's hypoxia I did consult the hospitalist who agreed to admit patient and write orders for admission. FINAL IMPRESSION    1. Hypoxia    2. COVID-19    3.  Pneumonia due to organism        PLAN  Admission to the hospital    (Please note that this note was completed with a voice recognition program.  Every attempt was made to edit the dictations, but inevitably there remain words that are mis-transcribed.)        CRISTOBAL Humphreys - MARGARET  02/04/21 1359

## 2021-02-05 ENCOUNTER — APPOINTMENT (OUTPATIENT)
Dept: CT IMAGING | Age: 61
DRG: 871 | End: 2021-02-05
Payer: MEDICARE

## 2021-02-05 LAB
A/G RATIO: 0.9 (ref 1.1–2.2)
ALBUMIN SERPL-MCNC: 3.1 G/DL (ref 3.4–5)
ALP BLD-CCNC: 373 U/L (ref 40–129)
ALT SERPL-CCNC: 111 U/L (ref 10–40)
ANION GAP SERPL CALCULATED.3IONS-SCNC: 14 MMOL/L (ref 3–16)
AST SERPL-CCNC: 75 U/L (ref 15–37)
BASOPHILS ABSOLUTE: 0 K/UL (ref 0–0.2)
BASOPHILS RELATIVE PERCENT: 0.1 %
BILIRUB SERPL-MCNC: 0.5 MG/DL (ref 0–1)
BUN BLDV-MCNC: 17 MG/DL (ref 7–20)
C-REACTIVE PROTEIN: 322 MG/L (ref 0–5.1)
CALCIUM SERPL-MCNC: 8.5 MG/DL (ref 8.3–10.6)
CHLORIDE BLD-SCNC: 104 MMOL/L (ref 99–110)
CO2: 22 MMOL/L (ref 21–32)
CREAT SERPL-MCNC: 0.9 MG/DL (ref 0.8–1.3)
D DIMER: >5250 NG/ML DDU (ref 0–229)
EOSINOPHILS ABSOLUTE: 0 K/UL (ref 0–0.6)
EOSINOPHILS RELATIVE PERCENT: 0 %
ESTIMATED AVERAGE GLUCOSE: 154.2 MG/DL
GFR AFRICAN AMERICAN: >60
GFR NON-AFRICAN AMERICAN: >60
GLOBULIN: 3.5 G/DL
GLUCOSE BLD-MCNC: 158 MG/DL (ref 70–99)
GLUCOSE BLD-MCNC: 172 MG/DL (ref 70–99)
GLUCOSE BLD-MCNC: 196 MG/DL (ref 70–99)
GLUCOSE BLD-MCNC: 249 MG/DL (ref 70–99)
GLUCOSE BLD-MCNC: 309 MG/DL (ref 70–99)
HBA1C MFR BLD: 7 %
HCT VFR BLD CALC: 40.6 % (ref 40.5–52.5)
HEMOGLOBIN: 13.6 G/DL (ref 13.5–17.5)
L. PNEUMOPHILA SEROGP 1 UR AG: NORMAL
LYMPHOCYTES ABSOLUTE: 0.9 K/UL (ref 1–5.1)
LYMPHOCYTES RELATIVE PERCENT: 7.6 %
MCH RBC QN AUTO: 30.9 PG (ref 26–34)
MCHC RBC AUTO-ENTMCNC: 33.4 G/DL (ref 31–36)
MCV RBC AUTO: 92.6 FL (ref 80–100)
MONOCYTES ABSOLUTE: 0.6 K/UL (ref 0–1.3)
MONOCYTES RELATIVE PERCENT: 4.7 %
NEUTROPHILS ABSOLUTE: 10.7 K/UL (ref 1.7–7.7)
NEUTROPHILS RELATIVE PERCENT: 87.6 %
PDW BLD-RTO: 14.4 % (ref 12.4–15.4)
PERFORMED ON: ABNORMAL
PLATELET # BLD: 144 K/UL (ref 135–450)
PMV BLD AUTO: 9.2 FL (ref 5–10.5)
POTASSIUM REFLEX MAGNESIUM: 4.3 MMOL/L (ref 3.5–5.1)
PROCALCITONIN: 0.26 NG/ML (ref 0–0.15)
RBC # BLD: 4.39 M/UL (ref 4.2–5.9)
SODIUM BLD-SCNC: 140 MMOL/L (ref 136–145)
STREP PNEUMONIAE ANTIGEN, URINE: NORMAL
TOTAL PROTEIN: 6.6 G/DL (ref 6.4–8.2)
WBC # BLD: 12.2 K/UL (ref 4–11)

## 2021-02-05 PROCEDURE — 36415 COLL VENOUS BLD VENIPUNCTURE: CPT

## 2021-02-05 PROCEDURE — 85025 COMPLETE CBC W/AUTO DIFF WBC: CPT

## 2021-02-05 PROCEDURE — 86140 C-REACTIVE PROTEIN: CPT

## 2021-02-05 PROCEDURE — 83036 HEMOGLOBIN GLYCOSYLATED A1C: CPT

## 2021-02-05 PROCEDURE — 2580000003 HC RX 258: Performed by: INTERNAL MEDICINE

## 2021-02-05 PROCEDURE — 71260 CT THORAX DX C+: CPT

## 2021-02-05 PROCEDURE — 84145 PROCALCITONIN (PCT): CPT

## 2021-02-05 PROCEDURE — 85379 FIBRIN DEGRADATION QUANT: CPT

## 2021-02-05 PROCEDURE — 2500000003 HC RX 250 WO HCPCS

## 2021-02-05 PROCEDURE — 6370000000 HC RX 637 (ALT 250 FOR IP): Performed by: INTERNAL MEDICINE

## 2021-02-05 PROCEDURE — 2060000000 HC ICU INTERMEDIATE R&B

## 2021-02-05 PROCEDURE — 6360000002 HC RX W HCPCS: Performed by: INTERNAL MEDICINE

## 2021-02-05 PROCEDURE — 6360000004 HC RX CONTRAST MEDICATION: Performed by: INTERNAL MEDICINE

## 2021-02-05 PROCEDURE — 80053 COMPREHEN METABOLIC PANEL: CPT

## 2021-02-05 PROCEDURE — 2580000003 HC RX 258

## 2021-02-05 PROCEDURE — 99222 1ST HOSP IP/OBS MODERATE 55: CPT | Performed by: INTERNAL MEDICINE

## 2021-02-05 RX ADMIN — DEXAMETHASONE 6 MG: 6 TABLET ORAL at 09:08

## 2021-02-05 RX ADMIN — INSULIN LISPRO 1 UNITS: 100 INJECTION, SOLUTION INTRAVENOUS; SUBCUTANEOUS at 12:57

## 2021-02-05 RX ADMIN — ACETAMINOPHEN 650 MG: 325 TABLET ORAL at 22:15

## 2021-02-05 RX ADMIN — REMDESIVIR 100 MG: 100 INJECTION, POWDER, LYOPHILIZED, FOR SOLUTION INTRAVENOUS at 20:48

## 2021-02-05 RX ADMIN — ENOXAPARIN SODIUM 80 MG: 80 INJECTION SUBCUTANEOUS at 09:09

## 2021-02-05 RX ADMIN — ENOXAPARIN SODIUM 80 MG: 80 INJECTION SUBCUTANEOUS at 20:48

## 2021-02-05 RX ADMIN — SODIUM CHLORIDE, PRESERVATIVE FREE 10 ML: 5 INJECTION INTRAVENOUS at 20:49

## 2021-02-05 RX ADMIN — INSULIN LISPRO 1 UNITS: 100 INJECTION, SOLUTION INTRAVENOUS; SUBCUTANEOUS at 09:10

## 2021-02-05 RX ADMIN — SODIUM CHLORIDE, PRESERVATIVE FREE 10 ML: 5 INJECTION INTRAVENOUS at 09:07

## 2021-02-05 RX ADMIN — CEFTRIAXONE 1000 MG: 1 INJECTION, POWDER, FOR SOLUTION INTRAMUSCULAR; INTRAVENOUS at 15:39

## 2021-02-05 RX ADMIN — INSULIN LISPRO 1 UNITS: 100 INJECTION, SOLUTION INTRAVENOUS; SUBCUTANEOUS at 18:43

## 2021-02-05 RX ADMIN — Medication 6000 UNITS: at 09:08

## 2021-02-05 RX ADMIN — AZITHROMYCIN MONOHYDRATE 500 MG: 500 INJECTION, POWDER, LYOPHILIZED, FOR SOLUTION INTRAVENOUS at 18:43

## 2021-02-05 RX ADMIN — INSULIN LISPRO 2 UNITS: 100 INJECTION, SOLUTION INTRAVENOUS; SUBCUTANEOUS at 20:49

## 2021-02-05 RX ADMIN — IOPAMIDOL 75 ML: 755 INJECTION, SOLUTION INTRAVENOUS at 05:51

## 2021-02-05 RX ADMIN — DEXAMETHASONE SODIUM PHOSPHATE 14 MG: 4 INJECTION, SOLUTION INTRAMUSCULAR; INTRAVENOUS at 15:47

## 2021-02-05 ASSESSMENT — PAIN DESCRIPTION - PAIN TYPE: TYPE: ACUTE PAIN

## 2021-02-05 ASSESSMENT — PAIN SCALES - GENERAL
PAINLEVEL_OUTOF10: 0
PAINLEVEL_OUTOF10: 0

## 2021-02-05 ASSESSMENT — PAIN - FUNCTIONAL ASSESSMENT: PAIN_FUNCTIONAL_ASSESSMENT: ACTIVITIES ARE NOT PREVENTED

## 2021-02-05 ASSESSMENT — PAIN DESCRIPTION - DESCRIPTORS: DESCRIPTORS: ACHING

## 2021-02-05 ASSESSMENT — PAIN DESCRIPTION - ORIENTATION: ORIENTATION: ANTERIOR

## 2021-02-05 NOTE — PROGRESS NOTES
Hospitalist Progress Note      PCP: No primary care provider on file. Date of Admission: 2/4/2021    Chief Complaint: shortness of breath    Hospital Course:   62 y.o. male Mery Ryan with past medical hypertension, prediabetes  Presents with shortness of breath, coughing due to COVID-19. History obtained using  iPad  He had a sore throat last week, got COVID-19 tested on January 26 which came back positive. He has been feeling well until 3 days ago when he started having shortness of breath, progressively worse, now only to take 10-15 steps before needing rest and getting significantly winded.     Subjective: Says at night he was short of breath but feeling better this morning, at rest he doesn't have symptoms but on exertion he gets short of breath      Medications:  Reviewed    Infusion Medications    dextrose       Scheduled Medications    enoxaparin  1 mg/kg Subcutaneous BID    insulin lispro  0-6 Units Subcutaneous TID WC    insulin lispro  0-3 Units Subcutaneous Nightly    dexamethasone  6 mg Oral Daily    Vitamin D  6,000 Units Oral Daily    Followed by   Ellis Jj ON 2/11/2021] Vitamin D  2,000 Units Oral Daily    sodium chloride flush  10 mL Intravenous 2 times per day    cefTRIAXone (ROCEPHIN) IV  1,000 mg Intravenous Q24H    azithromycin  500 mg Intravenous Q24H    remdesivir IVPB  100 mg Intravenous Q24H     PRN Meds: glucose, dextrose, glucagon (rDNA), dextrose, acetaminophen **OR** acetaminophen, guaiFENesin-dextromethorphan, sodium chloride flush, acetaminophen **OR** acetaminophen, sodium chloride      Intake/Output Summary (Last 24 hours) at 2/5/2021 0921  Last data filed at 2/5/2021 0920  Gross per 24 hour   Intake 770 ml   Output 1 ml   Net 769 ml       Physical Exam Performed:    BP (!) 147/81   Pulse 56   Temp 99.3 °F (37.4 °C) (Oral)   Resp 18   Ht 5' 7\" (1.702 m)   Wt 177 lb 0.5 oz (80.3 kg)   SpO2 97%   BMI 27.73 kg/m²     General appearance: Ill-appearing  HEENT:  Normal cephalic, atraumatic without obvious deformity. Pupils equal, round, and reactive to light. Extra ocular muscles intact. Conjunctivae/corneas clear. Neck: Supple, with full range of motion. No jugular venous distention. Trachea midline. Respiratory: Respiratory effort at rest but gets winded with talking in sentences and minimal movement in bed. Some crackles appreciated but otherwise clear to auscultation  Cardiovascular:  Regular rate and rhythm with normal S1/S2 without murmurs, rubs or gallops. Abdomen: Soft, non-tender, non-distended with normal bowel sounds. Musculoskeletal:  No clubbing, cyanosis or edema bilaterally. Full range of motion without deformity. Skin: Skin color, texture, turgor normal.  No rashes or lesions. Neurologic:  Neurovascularly intact without any focal sensory/motor deficits. Cranial nerves: II-XII intact, grossly non-focal.  Psychiatric:  Alert and oriented, thought content appropriate, normal insight  Capillary Refill: Brisk,< 3 seconds   Peripheral Pulses: +2 palpable, equal bilaterally          Labs:   Recent Labs     02/04/21  1538 02/05/21  0454   WBC 14.7* 12.2*   HGB 14.7 13.6   HCT 44.3 40.6    144     Recent Labs     02/04/21  1453 02/05/21  0454    140   K 4.0 4.3    104   CO2 21 22   BUN 17 17   CREATININE 0.9 0.9   CALCIUM 9.3 8.5     Recent Labs     02/04/21  1453 02/05/21  0454   * 75*   * 111*   BILITOT 0.7 0.5   ALKPHOS 418* 373*     No results for input(s): INR in the last 72 hours. Recent Labs     02/04/21  1453   TROPONINI <0.01       Urinalysis:      Lab Results   Component Value Date    NITRU Negative 02/04/2021    WBCUA 1 02/04/2021    RBCUA 3 02/04/2021    BLOODU Negative 02/04/2021    SPECGRAV 1.016 02/04/2021    GLUCOSEU Negative 02/04/2021       Radiology:  CT CHEST PULMONARY EMBOLISM W CONTRAST   Final Result   Small pulmonary embolus left upper lobe and right lower lobe.       Bilateral airspace disease denoted by patchy ground glass opacity and   interlobular septal thickening, likely due to the related history of COVID-19   infection. Fatty liver. RECOMMENDATIONS:   Results discussed with Dr. Marina Garcia.  Alejandro Condon MD at 7:17 am on   2/5/2021         XR CHEST PORTABLE   Final Result   Bilateral airspace opacities could represent pulmonary edema, multifocal   bacterial pneumonia or atypical pneumonia                 Assessment/Plan:    Active Hospital Problems    Diagnosis Date Noted    Sepsis due to severe acute respiratory syndrome coronavirus 2 (SARS-CoV-2) (Formerly [facility]) [U07.1, A41.89] 02/04/2021     Sepsis due to COVID-19 pneumonia  Acute respiratory failure with hypoxemia due to COVID-19, 59% on room air on arrival to the ED, needed 15 L nonrebreather to maintain oxygen saturation then weaned to 6 L high flow  Started of a sore throat last week but has been short of breath for the last few days now  Rule out influenza with rapid test  Trend procalcitonin, if procalcitonin trend negative can DC Rocephin and continue only azithromycin for atypical coverage  Check and trend inflammatory markers  Received sepsis protocol fluids in the emergency room  D-dimer very high greater than >5250, CTPA was done which showed small pulmonary embolus in the left upper lobe and right lower lobe  Avoid nebulizer treatments, use metered-dose inhalers  Prone position as much as possible  Start Vitamin D 6000 units daily  Start Zn sulfate 50 mg OD  Decadron 6 mg x 10 days  Give dose of ivermectin 200 mcg/kg x 1        Lab Results   Component Value Date     CREATININE 0.9 02/04/2021     BUN 17 02/04/2021      02/04/2021     K 4.0 02/04/2021      02/04/2021     CO2 21 02/04/2021      Remdesivir Initiation Note (first dose 2/04)     Hugh Ray meets criteria for initiation of remdesivir under the emergency use authorization (EUA) based on the following:  · Known or suspected COVID-19  · Severe disease (SpO2 ? 94% on RA, requiring supplemental O2, or requiring invasive mechanical ventilation)  · Acceptable renal function  ? CrCl ? 30 ml/min based on SCr obtained prior to initiation OR   ? CrCl < 30 ml/min but the potential benefit of remdesivir outweighs the risk  · Acceptable hepatic function (ALT within 5 times ULN)     I have discussed with the patient/proxy information consistent with the Fact Sheet for Patients and Parents/Caregivers\" and the patient/proxy has agreed to initiating remdesivir after being:  · Given the Fact Sheet for Patients and Parents/Caregivers  · Informed of the alternatives to receiving remdesivir, and   · Informed that Alma Rosa Rinks is an unapproved drug that is authorized for use under EUA     Liver function tests will be monitored daily while on remdesivir.   Send type and screen for possible convalescent plasma rx  Monitor O2 sats, place O2 to maintain for SpO2 > 90%  Pulmonary consult for further evaluation recommendations     Bilateral small PE without acute cor pulmonale  Increase anticoagulation to 1 mg/kg twice daily  Likely provoked in the setting of COVID-19  Will need anticoagulation for 3 to 6 months    Hypertension  Continue hold blood pressure medications for now     Type 2 diabetes, his A1c 7.0  Low-dose sliding scale insulin  Hypoglycemia protocol  Point-of-care glucose test      DVT Prophylaxis: Lovenox 1 mg/kg twice daily  Diet: DIET CARB CONTROL; Carb Control: 4 carb choices (60 gms)/meal  Code Status: Full Code    PT/OT Eval Status: Ordered order once acute issues resolved    Dispo -continue inpatient care, critically ill I hope he doesn't end up in the ICU    Total critical care time spent not including procedures 41 minutes    Kelly Gonzalez MD  Hospitalist

## 2021-02-05 NOTE — CONSULTS
Patient is seen at the request of Dr. Tashi James for respiratory failure    PCP: No primary care provider on file. HISTORY OF PRESENT ILLNESS: 62y.o. year old male who was admitted on 2/4/21 for respiratory failure. He tested positive for the coronavirus on 1/26/21. He says over the past 2 days he has had shortness of breath with activity and  productive cough. His breathing improves with rest. In the ER, he was hypoxic to 59% on RA.      PAST MEDICAL HISTORY:  Past Medical History:   Diagnosis Date    Gout     Hypertension 02/12/2018    Prediabetes 02/12/2018       PAST SURGICAL HISTORY:  Past Surgical History:   Procedure Laterality Date    SHOULDER SURGERY  2014         MEDICATIONS:  Current Facility-Administered Medications: enoxaparin (LOVENOX) injection 80 mg, 1 mg/kg, Subcutaneous, BID  glucose (GLUTOSE) 40 % oral gel 15 g, 15 g, Oral, PRN  dextrose 50 % IV solution, 12.5 g, Intravenous, PRN  glucagon (rDNA) injection 1 mg, 1 mg, Intramuscular, PRN  dextrose 5 % solution, 100 mL/hr, Intravenous, PRN  acetaminophen (TYLENOL) tablet 650 mg, 650 mg, Oral, Q6H PRN **OR** acetaminophen (TYLENOL) suppository 650 mg, 650 mg, Rectal, Q6H PRN  insulin lispro (HUMALOG) injection vial 0-6 Units, 0-6 Units, Subcutaneous, TID WC  insulin lispro (HUMALOG) injection vial 0-3 Units, 0-3 Units, Subcutaneous, Nightly  dexamethasone (DECADRON) tablet 6 mg, 6 mg, Oral, Daily  Vitamin D (CHOLECALCIFEROL) tablet 6,000 Units, 6,000 Units, Oral, Daily **FOLLOWED BY** [START ON 2/11/2021] Vitamin D (CHOLECALCIFEROL) tablet 2,000 Units, 2,000 Units, Oral, Daily  guaiFENesin-dextromethorphan (ROBITUSSIN DM) 100-10 MG/5ML syrup 5 mL, 5 mL, Oral, Q4H PRN  sodium chloride flush 0.9 % injection 10 mL, 10 mL, Intravenous, 2 times per day  sodium chloride flush 0.9 % injection 10 mL, 10 mL, Intravenous, PRN  acetaminophen (TYLENOL) tablet 650 mg, 650 mg, Oral, Q6H PRN **OR** acetaminophen (TYLENOL) suppository 650 mg, 650 mg, Rectal, Q6H PRN  cefTRIAXone (ROCEPHIN) 1000 mg IVPB in 50 mL D5W minibag, 1,000 mg, Intravenous, Q24H  azithromycin (ZITHROMAX) 500 mg in D5W 250ml addavial, 500 mg, Intravenous, Q24H  [COMPLETED] remdesivir 200 mg in sodium chloride 0.9 % 250 mL IVPB, 200 mg, Intravenous, Once **FOLLOWED BY** remdesivir 100 mg in sodium chloride 0.9 % 250 mL IVPB, 100 mg, Intravenous, Q24H  0.9 % sodium chloride bolus, 30 mL, Intravenous, PRN      ALLERGIES:  Patient has No Known Allergies. FAMILY HISTORY:  Mother- HTN  Son- asthma    SOCIAL HISTORY:  Social History     Socioeconomic History    Marital status: Single     Spouse name: Not on file    Number of children: Not on file    Years of education: Not on file    Highest education level: Not on file   Occupational History    Not on file   Social Needs    Financial resource strain: Not on file    Food insecurity     Worry: Not on file     Inability: Not on file    Transportation needs     Medical: Not on file     Non-medical: Not on file   Tobacco Use    Smoking status: Never Smoker    Smokeless tobacco: Never Used   Substance and Sexual Activity    Alcohol use: No    Drug use: No    Sexual activity: Yes     Partners: Female   Lifestyle    Physical activity     Days per week: Not on file     Minutes per session: Not on file    Stress: Not on file   Relationships    Social connections     Talks on phone: Not on file     Gets together: Not on file     Attends [employer] service: Not on file     Active member of club or organization: Not on file     Attends meetings of clubs or organizations: Not on file     Relationship status: Not on file    Intimate partner violence     Fear of current or ex partner: Not on file     Emotionally abused: Not on file     Physically abused: Not on file     Forced sexual activity: Not on file   Other Topics Concern    Not on file   Social History Narrative    Not on file      reports that he has never smoked.  He has never used smokeless tobacco.    REVIEW OF SYSTEMS:  Constitutional: Negative for fever  HENT: Negative for sore throat  Eyes: Negative for redness   Respiratory: + for dyspnea, +cough  Cardiovascular: Negative for chest pain  Gastrointestinal: Negative for vomiting, diarrhea   Genitourinary: Negative for hematuria   Musculoskeletal: Negative for arthralgias   Skin: Negative for rash  Neurological: Negative for syncope  Hematological: Negative for adenopathy  Psychiatric/Behavorial: Negative for anxiety    Objective:   PHYSICAL EXAM:  Blood pressure (!) 129/57, pulse 63, temperature 98.9 °F (37.2 °C), temperature source Oral, resp. rate 18, height 5' 7\" (1.702 m), weight 177 lb 0.5 oz (80.3 kg), SpO2 93 %. on 15L HF NC    Gen: Well developed; well nourished  Eyes: No scleral icterus. No conjunctival injection. ENT:  Oropharynx clear. External appearance of ears and nose normal.  Neck: Trachea midline. No obvious mass. No visible thyroid enlargement    Respiratory: Clear to auscultation bilaterally, no accessory muscle use  Cardiovascular: Regular rate and rhythm, no appreciable murmurs. No edema. Gastrointestinal: Soft, non-tender. No hernia  Skin: Warm and dry. No rashes or ulcers on visible areas. Normal texture and turgor  Lymphatic: No cervical LAD. No supraclavicular LAD. Musculoskeletal: No cyanosis, clubbing or joint deformity.     Psychiatric: Normal mood and affect; exhibits normal insight and judgement       Data Reviewed:   LABS:  CBC:   Recent Labs     02/04/21  1538 02/05/21  0454   WBC 14.7* 12.2*   HGB 14.7 13.6   HCT 44.3 40.6   MCV 92.7 92.6    144     BMP:   Recent Labs     02/04/21  1453 02/05/21  0454    140   K 4.0 4.3    104   CO2 21 22   BUN 17 17   CREATININE 0.9 0.9     LIVER PROFILE:   Recent Labs     02/04/21  1453 02/05/21  0454   * 75*   * 111*   LIPASE 23.0  --    BILITOT 0.7 0.5   ALKPHOS 418* 373*     PT/INR: No results for input(s): PROTIME, INR in the last 72 hours.  APTT: No results for input(s): APTT in the last 72 hours. Images and reports from chest imaging were reviewed by me. My interpretation is:  CXR (2/4/21): Diffuse infiltrates; low lung volumes   Chest CT (2/5/21): No LAD; GGO bilaterally; small NNAMDI and RLL PE    Assessment:     Acute hypoxic respiratory failure  COVID-19 pneumonia  Multifocal pneumonia  Pulmonary embolism     Plan:      Acute hypoxic respiratory failure  -On high flow oxygen. Wean as able to keep saturation>90%    COVID-19 pneumonia  -Change decadron to 20mg IV daily  -Remdesivir  -Check CMP daily    Multifocal pneumonia  -Agree with ceftriaxone and azithromycin given elevated procalcitonin    Pulmonary embolism   -Lovenox twice daily      Patient is at high risk given the presence of respiratory failure requiring the use of high flow oxygen.     Ronni Mota MD  [facility] Pulmonology, Critical Care and Sleep

## 2021-02-05 NOTE — CARE COORDINATION
Tried twice to have [employer]  services (#[phone]) call into patient room (10:27am & 2:34pm) but no answer. Need initial assessment, ACP, & dc plan. Will attempt again as timing permits. Per chart review patient currently on 15L NC. Flowsheets show patient independent after set up.   Electronically signed by [place] RN Case Management [phone] on 2/5/2021 at 2:33 PM

## 2021-02-05 NOTE — PLAN OF CARE
Problem: Gas Exchange - Impaired  Goal: Absence of hypoxia  Outcome: Ongoing     Problem: Breathing Pattern - Ineffective  Goal: Ability to achieve and maintain a regular respiratory rate  Outcome: Ongoing     Problem:  Body Temperature -  Risk of, Imbalanced  Goal: Complications related to the disease process, condition or treatment will be avoided or minimized  Outcome: Ongoing     Problem: Isolation Precautions - Risk of Spread of Infection  Goal: Prevent transmission of infection  Outcome: Ongoing     Problem: Patient Education: Go to Patient Education Activity  Goal: Patient/Family Education  Outcome: Ongoing

## 2021-02-05 NOTE — PLAN OF CARE
Problem: Airway Clearance - Ineffective  Goal: Achieve or maintain patent airway  2/5/2021 1146 by Deanne Bustamante RN  Outcome: Ongoing     Problem: Gas Exchange - Impaired  Goal: Absence of hypoxia  2/5/2021 1146 by Deanne Bustamante RN  Outcome: Ongoing     Problem: Gas Exchange - Impaired  Goal: Promote optimal lung function  2/5/2021 1146 by Deanne Bustamante RN  Outcome: Ongoing     Problem: Breathing Pattern - Ineffective  Goal: Ability to achieve and maintain a regular respiratory rate  2/5/2021 1146 by Deanne Bustamante RN  Outcome: Ongoing     Problem: Body Temperature -  Risk of, Imbalanced  Goal: Ability to maintain a body temperature within defined limits  2/5/2021 1146 by Deanne Bustamante RN  Outcome: Ongoing     Problem: Body Temperature -  Risk of, Imbalanced  Goal: Will regain or maintain usual level of consciousness  2/5/2021 1146 by Deanne Bustamante RN  Outcome: Ongoing     Problem:  Body Temperature -  Risk of, Imbalanced  Goal: Complications related to the disease process, condition or treatment will be avoided or minimized  2/5/2021 1146 by Deanne Bustamante RN  Outcome: Ongoing     Problem: Isolation Precautions - Risk of Spread of Infection  Goal: Prevent transmission of infection  2/5/2021 1146 by Deanne Bustamante RN  Outcome: Ongoing     Problem: Nutrition Deficits  Goal: Optimize nutritional status  2/5/2021 1146 by Deanne Bustamante RN  Outcome: Ongoing     Problem: Risk for Fluid Volume Deficit  Goal: Maintain normal heart rhythm  2/5/2021 1146 by Deanne Bustamante RN  Outcome: Ongoing     Problem: Risk for Fluid Volume Deficit  Goal: Maintain absence of muscle cramping  2/5/2021 1146 by Deanne Bustamante RN  Outcome: Ongoing     Problem: Risk for Fluid Volume Deficit  Goal: Maintain normal serum potassium, sodium, calcium, phosphorus, and pH  2/5/2021 1146 by Deanne Bustamante RN  Outcome: Ongoing     Problem: Loneliness or Risk for Loneliness  Goal: Demonstrate positive use of time alone when socialization is not possible  2/5/2021 1146 by Deanne Bustamante RN  Outcome: Ongoing     Problem: Fatigue  Goal: Verbalize increase energy and improved vitality  2/5/2021 1146 by Deanne Bustamante RN  Outcome: Ongoing     Problem: Patient Education: Go to Patient Education Activity  Goal: Patient/Family Education  2/5/2021 1146 by Deanne Bustamante RN  Outcome: Ongoing     Problem: Skin Integrity:  Goal: Will show no infection signs and symptoms  Description: Will show no infection signs and symptoms  2/5/2021 1146 by Deanne Bustamante RN  Outcome: Ongoing     Problem: Skin Integrity:  Goal: Absence of new skin breakdown  Description: Absence of new skin breakdown  2/5/2021 1146 by Deanne Bustamante RN  Outcome: Ongoing

## 2021-02-06 LAB
A/G RATIO: 0.9 (ref 1.1–2.2)
ALBUMIN SERPL-MCNC: 3.1 G/DL (ref 3.4–5)
ALP BLD-CCNC: 341 U/L (ref 40–129)
ALT SERPL-CCNC: 90 U/L (ref 10–40)
ANION GAP SERPL CALCULATED.3IONS-SCNC: 17 MMOL/L (ref 3–16)
AST SERPL-CCNC: 42 U/L (ref 15–37)
BASOPHILS ABSOLUTE: 0 K/UL (ref 0–0.2)
BASOPHILS RELATIVE PERCENT: 0.1 %
BILIRUB SERPL-MCNC: 0.3 MG/DL (ref 0–1)
BUN BLDV-MCNC: 22 MG/DL (ref 7–20)
C-REACTIVE PROTEIN: 141 MG/L (ref 0–5.1)
CALCIUM SERPL-MCNC: 8.8 MG/DL (ref 8.3–10.6)
CHLORIDE BLD-SCNC: 104 MMOL/L (ref 99–110)
CO2: 20 MMOL/L (ref 21–32)
CREAT SERPL-MCNC: 0.8 MG/DL (ref 0.8–1.3)
D DIMER: >5250 NG/ML DDU (ref 0–229)
EOSINOPHILS ABSOLUTE: 0 K/UL (ref 0–0.6)
EOSINOPHILS RELATIVE PERCENT: 0 %
GFR AFRICAN AMERICAN: >60
GFR NON-AFRICAN AMERICAN: >60
GLOBULIN: 3.4 G/DL
GLUCOSE BLD-MCNC: 194 MG/DL (ref 70–99)
GLUCOSE BLD-MCNC: 234 MG/DL (ref 70–99)
GLUCOSE BLD-MCNC: 286 MG/DL (ref 70–99)
GLUCOSE BLD-MCNC: 296 MG/DL (ref 70–99)
GLUCOSE BLD-MCNC: 352 MG/DL (ref 70–99)
HCT VFR BLD CALC: 39.8 % (ref 40.5–52.5)
HEMOGLOBIN: 13.1 G/DL (ref 13.5–17.5)
LYMPHOCYTES ABSOLUTE: 1.1 K/UL (ref 1–5.1)
LYMPHOCYTES RELATIVE PERCENT: 5.8 %
MCH RBC QN AUTO: 31 PG (ref 26–34)
MCHC RBC AUTO-ENTMCNC: 32.9 G/DL (ref 31–36)
MCV RBC AUTO: 94.4 FL (ref 80–100)
MONOCYTES ABSOLUTE: 0.7 K/UL (ref 0–1.3)
MONOCYTES RELATIVE PERCENT: 3.8 %
NEUTROPHILS ABSOLUTE: 17 K/UL (ref 1.7–7.7)
NEUTROPHILS RELATIVE PERCENT: 90.3 %
PDW BLD-RTO: 14.4 % (ref 12.4–15.4)
PERFORMED ON: ABNORMAL
PLATELET # BLD: 160 K/UL (ref 135–450)
PMV BLD AUTO: 9.7 FL (ref 5–10.5)
POTASSIUM REFLEX MAGNESIUM: 4.6 MMOL/L (ref 3.5–5.1)
PROCALCITONIN: 0.25 NG/ML (ref 0–0.15)
PROCALCITONIN: 0.27 NG/ML (ref 0–0.15)
RBC # BLD: 4.22 M/UL (ref 4.2–5.9)
SODIUM BLD-SCNC: 141 MMOL/L (ref 136–145)
TOTAL PROTEIN: 6.5 G/DL (ref 6.4–8.2)
WBC # BLD: 18.8 K/UL (ref 4–11)

## 2021-02-06 PROCEDURE — 85025 COMPLETE CBC W/AUTO DIFF WBC: CPT

## 2021-02-06 PROCEDURE — 36415 COLL VENOUS BLD VENIPUNCTURE: CPT

## 2021-02-06 PROCEDURE — 94761 N-INVAS EAR/PLS OXIMETRY MLT: CPT

## 2021-02-06 PROCEDURE — 6360000002 HC RX W HCPCS: Performed by: INTERNAL MEDICINE

## 2021-02-06 PROCEDURE — 84145 PROCALCITONIN (PCT): CPT

## 2021-02-06 PROCEDURE — 6370000000 HC RX 637 (ALT 250 FOR IP): Performed by: INTERNAL MEDICINE

## 2021-02-06 PROCEDURE — 2580000003 HC RX 258: Performed by: INTERNAL MEDICINE

## 2021-02-06 PROCEDURE — 2060000000 HC ICU INTERMEDIATE R&B

## 2021-02-06 PROCEDURE — 85379 FIBRIN DEGRADATION QUANT: CPT

## 2021-02-06 PROCEDURE — 80053 COMPREHEN METABOLIC PANEL: CPT

## 2021-02-06 PROCEDURE — 86140 C-REACTIVE PROTEIN: CPT

## 2021-02-06 PROCEDURE — 2500000003 HC RX 250 WO HCPCS

## 2021-02-06 PROCEDURE — 2700000000 HC OXYGEN THERAPY PER DAY

## 2021-02-06 PROCEDURE — 2580000003 HC RX 258

## 2021-02-06 RX ADMIN — ENOXAPARIN SODIUM 80 MG: 80 INJECTION SUBCUTANEOUS at 07:58

## 2021-02-06 RX ADMIN — INSULIN LISPRO 2 UNITS: 100 INJECTION, SOLUTION INTRAVENOUS; SUBCUTANEOUS at 21:49

## 2021-02-06 RX ADMIN — REMDESIVIR 100 MG: 100 INJECTION, POWDER, LYOPHILIZED, FOR SOLUTION INTRAVENOUS at 16:34

## 2021-02-06 RX ADMIN — INSULIN LISPRO 5 UNITS: 100 INJECTION, SOLUTION INTRAVENOUS; SUBCUTANEOUS at 18:25

## 2021-02-06 RX ADMIN — Medication 6000 UNITS: at 07:57

## 2021-02-06 RX ADMIN — INSULIN LISPRO 2 UNITS: 100 INJECTION, SOLUTION INTRAVENOUS; SUBCUTANEOUS at 08:13

## 2021-02-06 RX ADMIN — SODIUM CHLORIDE, PRESERVATIVE FREE 10 ML: 5 INJECTION INTRAVENOUS at 21:48

## 2021-02-06 RX ADMIN — ENOXAPARIN SODIUM 80 MG: 80 INJECTION SUBCUTANEOUS at 21:48

## 2021-02-06 RX ADMIN — DEXAMETHASONE SODIUM PHOSPHATE 20 MG: 4 INJECTION, SOLUTION INTRAMUSCULAR; INTRAVENOUS at 07:58

## 2021-02-06 RX ADMIN — CEFTRIAXONE 1000 MG: 1 INJECTION, POWDER, FOR SOLUTION INTRAMUSCULAR; INTRAVENOUS at 15:34

## 2021-02-06 RX ADMIN — INSULIN LISPRO 3 UNITS: 100 INJECTION, SOLUTION INTRAVENOUS; SUBCUTANEOUS at 13:17

## 2021-02-06 RX ADMIN — AZITHROMYCIN MONOHYDRATE 500 MG: 500 INJECTION, POWDER, LYOPHILIZED, FOR SOLUTION INTRAVENOUS at 17:18

## 2021-02-06 NOTE — PLAN OF CARE
Problem: Gas Exchange - Impaired  Goal: Absence of hypoxia  2/6/2021 0025 by Nadir Hammond RN  Outcome: Ongoing     Problem: Gas Exchange - Impaired  Goal: Promote optimal lung function  2/6/2021 0025 by Nadir Hammond RN  Outcome: Ongoing     Problem:  Body Temperature -  Risk of, Imbalanced  Goal: Complications related to the disease process, condition or treatment will be avoided or minimized  2/6/2021 0025 by Nadir Hammond RN  Outcome: Ongoing     Problem: Isolation Precautions - Risk of Spread of Infection  Goal: Prevent transmission of infection  2/6/2021 0025 by Nadir Hammond RN  Outcome: Ongoing

## 2021-02-06 NOTE — PLAN OF CARE
Problem: Gas Exchange - Impaired  Goal: Absence of hypoxia  2/6/2021 0932 by Marlo Alves RN  Outcome: Ongoing  2/6/2021 0025 by Priti Villegas RN  Outcome: Ongoing  Goal: Promote optimal lung function  2/6/2021 0932 by Marlo Alves RN  Outcome: Ongoing  2/6/2021 0025 by Priti Villegas RN  Outcome: Ongoing     Problem: Breathing Pattern - Ineffective  Goal: Ability to achieve and maintain a regular respiratory rate  2/6/2021 0932 by Marlo Alves RN  Outcome: Ongoing  2/6/2021 0025 by Priti Villegas RN  Outcome: Ongoing     Problem:  Body Temperature -  Risk of, Imbalanced  Goal: Ability to maintain a body temperature within defined limits  2/6/2021 0932 by Marlo Alves RN  Outcome: Ongoing  2/6/2021 0025 by Priti Villegas RN  Outcome: Ongoing  Goal: Will regain or maintain usual level of consciousness  2/6/2021 0932 by Marlo Alves RN  Outcome: Ongoing  2/6/2021 0025 by Priti Villegas RN  Outcome: Ongoing  Goal: Complications related to the disease process, condition or treatment will be avoided or minimized  2/6/2021 0932 by Marlo Alves RN  Outcome: Ongoing  2/6/2021 0025 by Priti Villegas RN  Outcome: Ongoing

## 2021-02-07 LAB
A/G RATIO: 0.9 (ref 1.1–2.2)
ALBUMIN SERPL-MCNC: 3.4 G/DL (ref 3.4–5)
ALP BLD-CCNC: 384 U/L (ref 40–129)
ALT SERPL-CCNC: 132 U/L (ref 10–40)
ANION GAP SERPL CALCULATED.3IONS-SCNC: 14 MMOL/L (ref 3–16)
AST SERPL-CCNC: 54 U/L (ref 15–37)
BASOPHILS ABSOLUTE: 0 K/UL (ref 0–0.2)
BASOPHILS RELATIVE PERCENT: 0.1 %
BILIRUB SERPL-MCNC: 0.4 MG/DL (ref 0–1)
BUN BLDV-MCNC: 24 MG/DL (ref 7–20)
CALCIUM SERPL-MCNC: 9 MG/DL (ref 8.3–10.6)
CHLORIDE BLD-SCNC: 100 MMOL/L (ref 99–110)
CO2: 25 MMOL/L (ref 21–32)
CREAT SERPL-MCNC: 1 MG/DL (ref 0.8–1.3)
EOSINOPHILS ABSOLUTE: 0 K/UL (ref 0–0.6)
EOSINOPHILS RELATIVE PERCENT: 0 %
GFR AFRICAN AMERICAN: >60
GFR NON-AFRICAN AMERICAN: >60
GLOBULIN: 3.6 G/DL
GLUCOSE BLD-MCNC: 158 MG/DL (ref 70–99)
GLUCOSE BLD-MCNC: 245 MG/DL (ref 70–99)
GLUCOSE BLD-MCNC: 334 MG/DL (ref 70–99)
GLUCOSE BLD-MCNC: 499 MG/DL (ref 70–99)
HCT VFR BLD CALC: 41.3 % (ref 40.5–52.5)
HEMOGLOBIN: 13.7 G/DL (ref 13.5–17.5)
LYMPHOCYTES ABSOLUTE: 1.5 K/UL (ref 1–5.1)
LYMPHOCYTES RELATIVE PERCENT: 7.4 %
MCH RBC QN AUTO: 30.8 PG (ref 26–34)
MCHC RBC AUTO-ENTMCNC: 33.2 G/DL (ref 31–36)
MCV RBC AUTO: 92.7 FL (ref 80–100)
MONOCYTES ABSOLUTE: 1 K/UL (ref 0–1.3)
MONOCYTES RELATIVE PERCENT: 5 %
NEUTROPHILS ABSOLUTE: 17.3 K/UL (ref 1.7–7.7)
NEUTROPHILS RELATIVE PERCENT: 87.5 %
PDW BLD-RTO: 14.3 % (ref 12.4–15.4)
PERFORMED ON: ABNORMAL
PLATELET # BLD: 226 K/UL (ref 135–450)
PMV BLD AUTO: 9.8 FL (ref 5–10.5)
POTASSIUM REFLEX MAGNESIUM: 4.2 MMOL/L (ref 3.5–5.1)
RBC # BLD: 4.45 M/UL (ref 4.2–5.9)
SODIUM BLD-SCNC: 139 MMOL/L (ref 136–145)
TOTAL PROTEIN: 7 G/DL (ref 6.4–8.2)
WBC # BLD: 19.8 K/UL (ref 4–11)

## 2021-02-07 PROCEDURE — 2700000000 HC OXYGEN THERAPY PER DAY

## 2021-02-07 PROCEDURE — 6360000002 HC RX W HCPCS: Performed by: INTERNAL MEDICINE

## 2021-02-07 PROCEDURE — 6370000000 HC RX 637 (ALT 250 FOR IP): Performed by: INTERNAL MEDICINE

## 2021-02-07 PROCEDURE — 80053 COMPREHEN METABOLIC PANEL: CPT

## 2021-02-07 PROCEDURE — 2580000003 HC RX 258: Performed by: INTERNAL MEDICINE

## 2021-02-07 PROCEDURE — 2060000000 HC ICU INTERMEDIATE R&B

## 2021-02-07 PROCEDURE — 2500000003 HC RX 250 WO HCPCS

## 2021-02-07 PROCEDURE — 36415 COLL VENOUS BLD VENIPUNCTURE: CPT

## 2021-02-07 PROCEDURE — 2580000003 HC RX 258

## 2021-02-07 PROCEDURE — 94761 N-INVAS EAR/PLS OXIMETRY MLT: CPT

## 2021-02-07 PROCEDURE — 85025 COMPLETE CBC W/AUTO DIFF WBC: CPT

## 2021-02-07 RX ADMIN — INSULIN LISPRO 3 UNITS: 100 INJECTION, SOLUTION INTRAVENOUS; SUBCUTANEOUS at 21:25

## 2021-02-07 RX ADMIN — INSULIN LISPRO 4 UNITS: 100 INJECTION, SOLUTION INTRAVENOUS; SUBCUTANEOUS at 17:44

## 2021-02-07 RX ADMIN — ENOXAPARIN SODIUM 80 MG: 80 INJECTION SUBCUTANEOUS at 08:01

## 2021-02-07 RX ADMIN — INSULIN LISPRO 1 UNITS: 100 INJECTION, SOLUTION INTRAVENOUS; SUBCUTANEOUS at 12:42

## 2021-02-07 RX ADMIN — SODIUM CHLORIDE, PRESERVATIVE FREE 10 ML: 5 INJECTION INTRAVENOUS at 21:23

## 2021-02-07 RX ADMIN — DEXAMETHASONE SODIUM PHOSPHATE 20 MG: 4 INJECTION, SOLUTION INTRAMUSCULAR; INTRAVENOUS at 12:36

## 2021-02-07 RX ADMIN — REMDESIVIR 100 MG: 100 INJECTION, POWDER, LYOPHILIZED, FOR SOLUTION INTRAVENOUS at 18:51

## 2021-02-07 RX ADMIN — INSULIN LISPRO 3 UNITS: 100 INJECTION, SOLUTION INTRAVENOUS; SUBCUTANEOUS at 08:20

## 2021-02-07 RX ADMIN — ENOXAPARIN SODIUM 80 MG: 80 INJECTION SUBCUTANEOUS at 21:23

## 2021-02-07 RX ADMIN — Medication 6000 UNITS: at 08:01

## 2021-02-07 RX ADMIN — CEFTRIAXONE 1000 MG: 1 INJECTION, POWDER, FOR SOLUTION INTRAMUSCULAR; INTRAVENOUS at 16:39

## 2021-02-07 RX ADMIN — AZITHROMYCIN MONOHYDRATE 500 MG: 500 INJECTION, POWDER, LYOPHILIZED, FOR SOLUTION INTRAVENOUS at 17:25

## 2021-02-07 ASSESSMENT — PAIN SCALES - GENERAL
PAINLEVEL_OUTOF10: 0
PAINLEVEL_OUTOF10: 0

## 2021-02-07 NOTE — PROGRESS NOTES
Hospitalist Progress Note      PCP: No primary care provider on file. Date of Admission: 2/4/2021    Chief Complaint: shortness of breath    Hospital Course:   62 y.o. male Aleyda Luna with past medical hypertension, prediabetes  Presents with shortness of breath, coughing due to COVID-19. History obtained using  iPad  He had a sore throat last week, got COVID-19 tested on January 26 which came back positive. He has been feeling well until 3 days ago when he started having shortness of breath, progressively worse, now only to take 10-15 steps before needing rest and getting significantly winded.     Subjective:  He feels less SOB today, denies CP, mentions he is able to walk with less SOB    Medications:  Reviewed    Infusion Medications    dextrose       Scheduled Medications    enoxaparin  1 mg/kg Subcutaneous BID    dexamethasone  20 mg Intravenous Q24H    insulin lispro  0-6 Units Subcutaneous TID WC    insulin lispro  0-3 Units Subcutaneous Nightly    Vitamin D  6,000 Units Oral Daily    Followed by   Henrietta Nolan ON 2/11/2021] Vitamin D  2,000 Units Oral Daily    sodium chloride flush  10 mL Intravenous 2 times per day    cefTRIAXone (ROCEPHIN) IV  1,000 mg Intravenous Q24H    azithromycin  500 mg Intravenous Q24H    remdesivir IVPB  100 mg Intravenous Q24H     PRN Meds: glucose, dextrose, glucagon (rDNA), dextrose, guaiFENesin-dextromethorphan, sodium chloride flush, acetaminophen **OR** acetaminophen, sodium chloride      Intake/Output Summary (Last 24 hours) at 2/6/2021 2327  Last data filed at 2/6/2021 2157  Gross per 24 hour   Intake 2425 ml   Output 400 ml   Net 2025 ml       Physical Exam Performed:    BP (!) 150/77   Pulse 51   Temp 99.2 °F (37.3 °C) (Oral)   Resp 16   Ht 5' 7\" (1.702 m)   Wt 175 lb 11.3 oz (79.7 kg)   SpO2 97%   BMI 27.52 kg/m²     General appearance: Ill-appearing, sittiing on side of bed comfortably  HEENT:  Normal cephalic, atraumatic without obvious deformity. Pupils equal, round, and reactive to light. Extra ocular muscles intact. Conjunctivae/corneas clear. Neck: Supple, with full range of motion. No jugular venous distention. Trachea midline. Respiratory: Respiratory effort at rest but gets winded with talking in sentences and minimal movement in bed. Some crackles appreciated but otherwise clear to auscultation  Cardiovascular:  Regular rate and rhythm with normal S1/S2 without murmurs, rubs or gallops. Abdomen: Soft, non-tender, non-distended with normal bowel sounds. Musculoskeletal:  No clubbing, cyanosis or edema bilaterally. Full range of motion without deformity. Skin: Skin color, texture, turgor normal.  No rashes or lesions. Neurologic:  Neurovascularly intact without any focal sensory/motor deficits. Cranial nerves: II-XII intact, grossly non-focal.  Psychiatric:  Alert and oriented, thought content appropriate, normal insight  Capillary Refill: Brisk,< 3 seconds   Peripheral Pulses: +2 palpable, equal bilaterally          Labs:   Recent Labs     02/04/21  1538 02/05/21  0454 02/06/21  0509   WBC 14.7* 12.2* 18.8*   HGB 14.7 13.6 13.1*   HCT 44.3 40.6 39.8*    144 160     Recent Labs     02/04/21  1453 02/05/21  0454 02/06/21  0509    140 141   K 4.0 4.3 4.6    104 104   CO2 21 22 20*   BUN 17 17 22*   CREATININE 0.9 0.9 0.8   CALCIUM 9.3 8.5 8.8     Recent Labs     02/04/21  1453 02/05/21  0454 02/06/21  0509   * 75* 42*   * 111* 90*   BILITOT 0.7 0.5 0.3   ALKPHOS 418* 373* 341*     No results for input(s): INR in the last 72 hours.   Recent Labs     02/04/21  1453   TROPONINI <0.01       Urinalysis:      Lab Results   Component Value Date    NITRU Negative 02/04/2021    WBCUA 1 02/04/2021    RBCUA 3 02/04/2021    BLOODU Negative 02/04/2021    SPECGRAV 1.016 02/04/2021    GLUCOSEU Negative 02/04/2021       Radiology:  CT CHEST PULMONARY EMBOLISM W CONTRAST   Final Result   Small pulmonary embolus left upper lobe and right lower lobe. Bilateral airspace disease denoted by patchy ground glass opacity and   interlobular septal thickening, likely due to the related history of COVID-19   infection. Fatty liver. RECOMMENDATIONS:   Results discussed with Dr. Bonnie Ortiz.  Melo Andrews MD at 7:17 am on   2/5/2021         XR CHEST PORTABLE   Final Result   Bilateral airspace opacities could represent pulmonary edema, multifocal   bacterial pneumonia or atypical pneumonia                 Assessment/Plan:    Active Hospital Problems    Diagnosis Date Noted    Acute respiratory failure with hypoxia ([facility] [address] [J96.01]     Pneumonia due to COVID-19 virus [U07.1, J12.82]     Multifocal pneumonia [J18.9]     Multiple subsegmental pulmonary emboli without acute cor pulmonale (HCC) [I26.94]     Sepsis due to severe acute respiratory syndrome coronavirus 2 (SARS-CoV-2) (HCC) [U07.1, A41.89] 02/04/2021    Type 2 diabetes mellitus ([facility] [address] [E11.9] 03/18/2019     Sepsis due to COVID-19 pneumonia  Acute respiratory failure with hypoxemia due to COVID-19, 59% on room air on arrival to the ED, needed 15 L nonrebreather to maintain oxygen saturation then weaned to 6 L high flow  Started of a sore throat last week but has been short of breath for the last few days now  Rule out influenza with rapid test  Trend procalcitonin, if procalcitonin trend negative can DC Rocephin and continue only azithromycin for atypical coverage  Check and trend inflammatory markers  Received sepsis protocol fluids in the emergency room  D-dimer very high greater than >5250, CTPA was done which showed small pulmonary embolus in the left upper lobe and right lower lobe  Avoid nebulizer treatments, use metered-dose inhalers  Prone position as much as possible  Start Vitamin D 6000 units daily  Start Zn sulfate 50 mg OD  Decadron 6 mg x 10 days  Give dose of ivermectin 200 mcg/kg x 1        Lab Results   Component Value Date     CREATININE 0.9 02/04/2021     BUN 17 02/04/2021      02/04/2021     K 4.0 02/04/2021      02/04/2021     CO2 21 02/04/2021      Remdesivir Initiation Note (first dose 2/04)        Liver function tests will be monitored daily while on remdesivir.   Send type and screen for possible convalescent plasma rx  Monitor O2 sats, place O2 to maintain for SpO2 > 90%  Pulmonary consult for further evaluation recommendations     Bilateral small PE without acute cor pulmonale  Increase anticoagulation to 1 mg/kg twice daily  Likely provoked in the setting of COVID-19  Will need anticoagulation for 3 to 6 months    Hypertension  Continue hold blood pressure medications for now     Type 2 diabetes, his A1c 7.0  Low-dose sliding scale insulin  Hypoglycemia protocol  Point-of-care glucose test      DVT Prophylaxis: Lovenox 1 mg/kg twice daily  Diet: DIET CARB CONTROL; Carb Control: 4 carb choices (60 gms)/meal  Code Status: Full Code    PT/OT Eval Status: Ordered order once acute issues resolved    Dispo - continue to wean down Tanner Birceño MD  Hospitalist

## 2021-02-07 NOTE — PROGRESS NOTES
Hospitalist Progress Note      PCP: No primary care provider on file. Date of Admission: 2/4/2021    Chief Complaint: shortness of breath    Hospital Course:   62 y.o. male Ariana Martinez with past medical hypertension, prediabetes  Presents with shortness of breath, coughing due to COVID-19. History obtained using  iPad  He had a sore throat last week, got COVID-19 tested on January 26 which came back positive. He has been feeling well until 3 days ago when he started having shortness of breath, progressively worse, now only to take 10-15 steps before needing rest and getting significantly winded.     Subjective: Patient mentions he feels overall better he has more exercise tolerance, denies chest pain shortness of breath  Medications:  Reviewed    Infusion Medications    dextrose       Scheduled Medications    enoxaparin  1 mg/kg Subcutaneous BID    dexamethasone  20 mg Intravenous Q24H    insulin lispro  0-6 Units Subcutaneous TID WC    insulin lispro  0-3 Units Subcutaneous Nightly    Vitamin D  6,000 Units Oral Daily    Followed by   Delores Condon ON 2/11/2021] Vitamin D  2,000 Units Oral Daily    sodium chloride flush  10 mL Intravenous 2 times per day    cefTRIAXone (ROCEPHIN) IV  1,000 mg Intravenous Q24H    azithromycin  500 mg Intravenous Q24H    remdesivir IVPB  100 mg Intravenous Q24H     PRN Meds: glucose, dextrose, glucagon (rDNA), dextrose, guaiFENesin-dextromethorphan, sodium chloride flush, acetaminophen **OR** acetaminophen, sodium chloride      Intake/Output Summary (Last 24 hours) at 2/7/2021 1407  Last data filed at 2/7/2021 1402  Gross per 24 hour   Intake 2645 ml   Output --   Net 2645 ml       Physical Exam Performed:    /62   Pulse 52   Temp 98.5 °F (36.9 °C) (Oral)   Resp 20   Ht 5' 7\" (1.702 m)   Wt 176 lb 9.4 oz (80.1 kg)   SpO2 95%   BMI 27.66 kg/m²     General appearance: Ill-appearing, sittiing on side of bed comfortably  HEENT:  Normal cephalic, atraumatic without obvious deformity. Pupils equal, round, and reactive to light. Extra ocular muscles intact. Conjunctivae/corneas clear. Neck: Supple, with full range of motion. No jugular venous distention. Trachea midline. Respiratory:  Decreased breathing sounds bilaterally however better than yesterday  Cardiovascular:  Regular rate and rhythm with normal S1/S2 without murmurs, rubs or gallops. Abdomen: Soft, non-tender, non-distended with normal bowel sounds. Musculoskeletal:  No clubbing, cyanosis or edema bilaterally. Full range of motion without deformity. Skin: Skin color, texture, turgor normal.  No rashes or lesions. Neurologic:  Neurovascularly intact without any focal sensory/motor deficits. Cranial nerves: II-XII intact, grossly non-focal.  Psychiatric:  Alert and oriented, thought content appropriate, normal insight  Capillary Refill: Brisk,< 3 seconds   Peripheral Pulses: +2 palpable, equal bilaterally          Labs:   Recent Labs     02/05/21  0454 02/06/21  0509 02/07/21  0604   WBC 12.2* 18.8* 19.8*   HGB 13.6 13.1* 13.7   HCT 40.6 39.8* 41.3    160 226     Recent Labs     02/05/21  0454 02/06/21  0509 02/07/21  0604    141 139   K 4.3 4.6 4.2    104 100   CO2 22 20* 25   BUN 17 22* 24*   CREATININE 0.9 0.8 1.0   CALCIUM 8.5 8.8 9.0     Recent Labs     02/05/21  0454 02/06/21  0509 02/07/21  0604   AST 75* 42* 54*   * 90* 132*   BILITOT 0.5 0.3 0.4   ALKPHOS 373* 341* 384*     No results for input(s): INR in the last 72 hours. Recent Labs     02/04/21  1453   TROPONINI <0.01       Urinalysis:      Lab Results   Component Value Date    NITRU Negative 02/04/2021    WBCUA 1 02/04/2021    RBCUA 3 02/04/2021    BLOODU Negative 02/04/2021    SPECGRAV 1.016 02/04/2021    GLUCOSEU Negative 02/04/2021       Radiology:  CT CHEST PULMONARY EMBOLISM W CONTRAST   Final Result   Small pulmonary embolus left upper lobe and right lower lobe.       Bilateral airspace disease denoted by patchy ground glass opacity and   interlobular septal thickening, likely due to the related history of COVID-19   infection. Fatty liver. RECOMMENDATIONS:   Results discussed with Dr. Ana Go.  Courtney Cruz MD at 7:17 am on   2/5/2021         XR CHEST PORTABLE   Final Result   Bilateral airspace opacities could represent pulmonary edema, multifocal   bacterial pneumonia or atypical pneumonia                 Assessment/Plan:    Active Hospital Problems    Diagnosis Date Noted    Acute respiratory failure with hypoxia ([facility] [address] [J96.01]     Pneumonia due to COVID-19 virus [U07.1, J12.82]     Multifocal pneumonia [J18.9]     Multiple subsegmental pulmonary emboli without acute cor pulmonale (HCC) [I26.94]     Sepsis due to severe acute respiratory syndrome coronavirus 2 (SARS-CoV-2) (HCC) [U07.1, A41.89] 02/04/2021    Type 2 diabetes mellitus ([facility] [address] [E11.9] 03/18/2019   Patient is a 61-year-old male with past medical history of hypertension diabetes who presents to the hospital for shortness of breath    Assessment  Sepsis due to COVID-19 pneumonia  Acute hypoxic respiratory failure secondary to COVID-19 pneumonia  Bilateral small PE  Hypertension  Diabetes mellitus 2    Plan  Continue remdesivir day 4, dexamethasone, continue empirical azithromycin, Rocephin  Lovenox twice daily dosing including therapeutic dosing for PE  Insulin sliding scale  Continue to wean oxygen as tolerated, currently on 15 L via nonrebreather  DVT Prophylaxis: Lovenox 1 mg/kg twice daily  Diet: DIET CARB CONTROL; Carb Control: 4 carb choices (60 gms)/meal  Code Status: Full Code    PT/OT Eval Status: Ordered order once acute issues resolved    Dispo - continue to wean down Laura Thompson MD  Hospitalist

## 2021-02-08 ENCOUNTER — APPOINTMENT (OUTPATIENT)
Dept: GENERAL RADIOLOGY | Age: 61
DRG: 871 | End: 2021-02-08
Payer: MEDICARE

## 2021-02-08 ENCOUNTER — CARE COORDINATION (OUTPATIENT)
Dept: CARE COORDINATION | Age: 61
End: 2021-02-08

## 2021-02-08 LAB
A/G RATIO: 0.9 (ref 1.1–2.2)
ALBUMIN SERPL-MCNC: 3.2 G/DL (ref 3.4–5)
ALP BLD-CCNC: 354 U/L (ref 40–129)
ALT SERPL-CCNC: 140 U/L (ref 10–40)
ANION GAP SERPL CALCULATED.3IONS-SCNC: 11 MMOL/L (ref 3–16)
AST SERPL-CCNC: 50 U/L (ref 15–37)
BASOPHILS ABSOLUTE: 0 K/UL (ref 0–0.2)
BASOPHILS RELATIVE PERCENT: 0.1 %
BILIRUB SERPL-MCNC: 0.4 MG/DL (ref 0–1)
BLOOD CULTURE, ROUTINE: NORMAL
BUN BLDV-MCNC: 27 MG/DL (ref 7–20)
CALCIUM SERPL-MCNC: 9 MG/DL (ref 8.3–10.6)
CHLORIDE BLD-SCNC: 98 MMOL/L (ref 99–110)
CO2: 24 MMOL/L (ref 21–32)
CREAT SERPL-MCNC: 1 MG/DL (ref 0.8–1.3)
CULTURE, BLOOD 2: NORMAL
EOSINOPHILS ABSOLUTE: 0 K/UL (ref 0–0.6)
EOSINOPHILS RELATIVE PERCENT: 0 %
GFR AFRICAN AMERICAN: >60
GFR NON-AFRICAN AMERICAN: >60
GLOBULIN: 3.4 G/DL
GLUCOSE BLD-MCNC: 296 MG/DL (ref 70–99)
GLUCOSE BLD-MCNC: 325 MG/DL (ref 70–99)
GLUCOSE BLD-MCNC: 347 MG/DL (ref 70–99)
GLUCOSE BLD-MCNC: 412 MG/DL (ref 70–99)
GLUCOSE BLD-MCNC: 461 MG/DL (ref 70–99)
HCT VFR BLD CALC: 40.5 % (ref 40.5–52.5)
HEMOGLOBIN: 13.6 G/DL (ref 13.5–17.5)
LYMPHOCYTES ABSOLUTE: 1.2 K/UL (ref 1–5.1)
LYMPHOCYTES RELATIVE PERCENT: 7.8 %
MCH RBC QN AUTO: 30.8 PG (ref 26–34)
MCHC RBC AUTO-ENTMCNC: 33.6 G/DL (ref 31–36)
MCV RBC AUTO: 91.6 FL (ref 80–100)
MONOCYTES ABSOLUTE: 1 K/UL (ref 0–1.3)
MONOCYTES RELATIVE PERCENT: 6.4 %
NEUTROPHILS ABSOLUTE: 13 K/UL (ref 1.7–7.7)
NEUTROPHILS RELATIVE PERCENT: 85.7 %
PDW BLD-RTO: 14 % (ref 12.4–15.4)
PERFORMED ON: ABNORMAL
PLATELET # BLD: 230 K/UL (ref 135–450)
PMV BLD AUTO: 10 FL (ref 5–10.5)
POTASSIUM REFLEX MAGNESIUM: 4.7 MMOL/L (ref 3.5–5.1)
PROCALCITONIN: 0.13 NG/ML (ref 0–0.15)
PROCALCITONIN: 0.16 NG/ML (ref 0–0.15)
RBC # BLD: 4.42 M/UL (ref 4.2–5.9)
SARS-COV-2, IGG: POSITIVE
SODIUM BLD-SCNC: 133 MMOL/L (ref 136–145)
TOTAL PROTEIN: 6.6 G/DL (ref 6.4–8.2)
WBC # BLD: 15.1 K/UL (ref 4–11)

## 2021-02-08 PROCEDURE — 94761 N-INVAS EAR/PLS OXIMETRY MLT: CPT

## 2021-02-08 PROCEDURE — 84145 PROCALCITONIN (PCT): CPT

## 2021-02-08 PROCEDURE — 71045 X-RAY EXAM CHEST 1 VIEW: CPT

## 2021-02-08 PROCEDURE — 6370000000 HC RX 637 (ALT 250 FOR IP): Performed by: INTERNAL MEDICINE

## 2021-02-08 PROCEDURE — 6360000002 HC RX W HCPCS: Performed by: INTERNAL MEDICINE

## 2021-02-08 PROCEDURE — 80053 COMPREHEN METABOLIC PANEL: CPT

## 2021-02-08 PROCEDURE — 2700000000 HC OXYGEN THERAPY PER DAY

## 2021-02-08 PROCEDURE — 2580000003 HC RX 258: Performed by: INTERNAL MEDICINE

## 2021-02-08 PROCEDURE — 85025 COMPLETE CBC W/AUTO DIFF WBC: CPT

## 2021-02-08 PROCEDURE — 2060000000 HC ICU INTERMEDIATE R&B

## 2021-02-08 PROCEDURE — 99231 SBSQ HOSP IP/OBS SF/LOW 25: CPT | Performed by: INTERNAL MEDICINE

## 2021-02-08 PROCEDURE — 2580000003 HC RX 258

## 2021-02-08 PROCEDURE — 36415 COLL VENOUS BLD VENIPUNCTURE: CPT

## 2021-02-08 PROCEDURE — 2500000003 HC RX 250 WO HCPCS

## 2021-02-08 RX ORDER — INSULIN GLARGINE 100 [IU]/ML
5 INJECTION, SOLUTION SUBCUTANEOUS NIGHTLY
Status: DISCONTINUED | OUTPATIENT
Start: 2021-02-08 | End: 2021-02-09

## 2021-02-08 RX ADMIN — INSULIN LISPRO 3 UNITS: 100 INJECTION, SOLUTION INTRAVENOUS; SUBCUTANEOUS at 22:33

## 2021-02-08 RX ADMIN — SODIUM CHLORIDE, PRESERVATIVE FREE 10 ML: 5 INJECTION INTRAVENOUS at 09:43

## 2021-02-08 RX ADMIN — AZITHROMYCIN MONOHYDRATE 500 MG: 500 INJECTION, POWDER, LYOPHILIZED, FOR SOLUTION INTRAVENOUS at 16:58

## 2021-02-08 RX ADMIN — ENOXAPARIN SODIUM 80 MG: 80 INJECTION SUBCUTANEOUS at 09:30

## 2021-02-08 RX ADMIN — INSULIN GLARGINE 5 UNITS: 100 INJECTION, SOLUTION SUBCUTANEOUS at 22:33

## 2021-02-08 RX ADMIN — INSULIN LISPRO 4 UNITS: 100 INJECTION, SOLUTION INTRAVENOUS; SUBCUTANEOUS at 13:56

## 2021-02-08 RX ADMIN — ENOXAPARIN SODIUM 80 MG: 80 INJECTION SUBCUTANEOUS at 22:32

## 2021-02-08 RX ADMIN — REMDESIVIR 100 MG: 100 INJECTION, POWDER, LYOPHILIZED, FOR SOLUTION INTRAVENOUS at 18:26

## 2021-02-08 RX ADMIN — DEXAMETHASONE SODIUM PHOSPHATE 20 MG: 4 INJECTION, SOLUTION INTRAMUSCULAR; INTRAVENOUS at 09:43

## 2021-02-08 RX ADMIN — SODIUM CHLORIDE, PRESERVATIVE FREE 10 ML: 5 INJECTION INTRAVENOUS at 22:51

## 2021-02-08 RX ADMIN — INSULIN LISPRO 3 UNITS: 100 INJECTION, SOLUTION INTRAVENOUS; SUBCUTANEOUS at 09:44

## 2021-02-08 RX ADMIN — CEFTRIAXONE 1000 MG: 1 INJECTION, POWDER, FOR SOLUTION INTRAMUSCULAR; INTRAVENOUS at 15:49

## 2021-02-08 RX ADMIN — INSULIN LISPRO 6 UNITS: 100 INJECTION, SOLUTION INTRAVENOUS; SUBCUTANEOUS at 16:59

## 2021-02-08 ASSESSMENT — PAIN SCALES - GENERAL: PAINLEVEL_OUTOF10: 0

## 2021-02-08 NOTE — PROGRESS NOTES
comfortably  HEENT:  Normal cephalic, atraumatic without obvious deformity. Pupils equal, round, and reactive to light. Extra ocular muscles intact. Conjunctivae/corneas clear. Neck: Supple, with full range of motion. No jugular venous distention. Trachea midline. Respiratory:  Mildly decreased breathing sounds bilaterally  Cardiovascular:  Regular rate and rhythm with normal S1/S2 without murmurs, rubs or gallops. Abdomen: Soft, non-tender, non-distended with normal bowel sounds. Musculoskeletal:  No clubbing, cyanosis or edema bilaterally. Full range of motion without deformity. Skin: Skin color, texture, turgor normal.  No rashes or lesions. Neurologic:  Neurovascularly intact without any focal sensory/motor deficits. Cranial nerves: II-XII intact, grossly non-focal.  Psychiatric:  Alert and oriented, thought content appropriate, normal insight  Capillary Refill: Brisk,< 3 seconds   Peripheral Pulses: +2 palpable, equal bilaterally          Labs:   Recent Labs     02/06/21  0509 02/07/21  0604 02/08/21  0629   WBC 18.8* 19.8* 15.1*   HGB 13.1* 13.7 13.6   HCT 39.8* 41.3 40.5    226 230     Recent Labs     02/06/21  0509 02/07/21  0604 02/08/21  0629    139 133*   K 4.6 4.2 4.7    100 98*   CO2 20* 25 24   BUN 22* 24* 27*   CREATININE 0.8 1.0 1.0   CALCIUM 8.8 9.0 9.0     Recent Labs     02/06/21  0509 02/07/21  0604 02/08/21  0629   AST 42* 54* 50*   ALT 90* 132* 140*   BILITOT 0.3 0.4 0.4   ALKPHOS 341* 384* 354*     No results for input(s): INR in the last 72 hours. No results for input(s): Lyndee Faustino in the last 72 hours. Urinalysis:      Lab Results   Component Value Date    NITRU Negative 02/04/2021    WBCUA 1 02/04/2021    RBCUA 3 02/04/2021    BLOODU Negative 02/04/2021    SPECGRAV 1.016 02/04/2021    GLUCOSEU Negative 02/04/2021       Radiology:  XR CHEST PORTABLE   Final Result   Scattered pulmonary opacities consistent with pneumonia.          CT CHEST PULMONARY

## 2021-02-08 NOTE — PROGRESS NOTES
Pulmonary Progress Note    Date of Admission: 2/4/2021   LOS: 4 days     Chief Complaint   Patient presents with    Positive For Covid-19     c/o being SOB, denies any other symptoms    Shortness of Breath       Assessment:     Acute Hypoxemic Respiratory Failure with SAO2 <90% on Room Air - improving  COVID 19 PNA  Multifocal PNA  PE    Plan:      -Wean supplemental oxygen to goal saturation of >90%  -decadron 20mg, remdesivir  -mild procal elevation likely 2/2 covid, but is coming down on CAP therapy so would complete 7 days    LOVENOX BID    Appears to be on trajectory of recovery  No further inpatient recommendation, we will sign off at this time    24 Hour Events/Subjective  No acute events o/n      Intake/Output Summary (Last 24 hours) at 2/8/2021 0878  Last data filed at 2/7/2021 2141  Gross per 24 hour   Intake 1440 ml   Output 300 ml   Net 1140 ml         PHYSICAL EXAM:   Blood pressure 125/60, pulse (!) 47, temperature 97.9 °F (36.6 °C), temperature source Oral, resp. rate 16, height 5' 7\" (1.702 m), weight 166 lb 10.7 oz (75.6 kg), SpO2 97 %.'  Due to the current efforts to prevent transmission of COVID-19 and also the need to preserve PPE for other caregivers, a face-to-face encounter with the patient was not performed. That being said, all relevant records and diagnostic tests were reviewed, including laboratory results and imaging. Please reference any relevant documentation elsewhere. Care will be coordinated with the primary service.       Medications:    Scheduled Meds:   enoxaparin  1 mg/kg Subcutaneous BID    dexamethasone  20 mg Intravenous Q24H    insulin lispro  0-6 Units Subcutaneous TID     insulin lispro  0-3 Units Subcutaneous Nightly    Vitamin D  6,000 Units Oral Daily    Followed by   Brandon Luna ON 2/11/2021] Vitamin D  2,000 Units Oral Daily    sodium chloride flush  10 mL Intravenous 2 times per day    cefTRIAXone (ROCEPHIN) IV  1,000 mg Intravenous Q24H    azithromycin  500 mg Intravenous Q24H    remdesivir IVPB  100 mg Intravenous Q24H       Continuous Infusions:   dextrose         PRN Meds:  glucose, dextrose, glucagon (rDNA), dextrose, guaiFENesin-dextromethorphan, sodium chloride flush, acetaminophen **OR** acetaminophen, sodium chloride    Labs reviewed:  CBC:   Recent Labs     02/06/21  0509 02/07/21  0604 02/08/21  0629   WBC 18.8* 19.8* 15.1*   HGB 13.1* 13.7 13.6   HCT 39.8* 41.3 40.5   MCV 94.4 92.7 91.6    226 230     BMP:   Recent Labs     02/06/21  0509 02/07/21  0604 02/08/21  0629    139 133*   K 4.6 4.2 4.7    100 98*   CO2 20* 25 24   BUN 22* 24* 27*   CREATININE 0.8 1.0 1.0     LIVER PROFILE:   Recent Labs     02/06/21  0509 02/07/21  0604 02/08/21  0629   AST 42* 54* 50*   ALT 90* 132* 140*   BILITOT 0.3 0.4 0.4   ALKPHOS 341* 384* 354*     PT/INR: No results for input(s): PROTIME, INR in the last 72 hours. APTT: No results for input(s): APTT in the last 72 hours. UA:No results for input(s): NITRITE, COLORU, PHUR, LABCAST, WBCUA, RBCUA, MUCUS, TRICHOMONAS, YEAST, BACTERIA, CLARITYU, SPECGRAV, LEUKOCYTESUR, UROBILINOGEN, BILIRUBINUR, BLOODU, GLUCOSEU, AMORPHOUS in the last 72 hours. Invalid input(s): Rayma Bence  No results for input(s): PH, PCO2, PO2 in the last 72 hours. Films:  Radiology Review:  Pertinent images / reports were reviewed as a part of this visit. CT Chest w/ contrast: No results found for this or any previous visit. CT Chest w/o contrast: No results found for this or any previous visit. CTPA:   Results for orders placed during the hospital encounter of 02/04/21   CT CHEST PULMONARY EMBOLISM W CONTRAST    Narrative EXAMINATION:  CTA OF THE CHEST 2/5/2021 5:35 am    TECHNIQUE:  CTA of the chest was performed after the administration of intravenous  contrast.  Multiplanar reformatted images are provided for review. MIP  images are provided for review.  Dose modulation, iterative reconstruction,  and/or weight based adjustment of the mA/kV was utilized to reduce the  radiation dose to as low as reasonably achievable. COMPARISON:  None. HISTORY:  ORDERING SYSTEM PROVIDED HISTO    RY: >5250 [address]imer, COVID19 evaluate for PE, please call [phone] if  significant results  TECHNOLOGIST PROVIDED HISTORY:  Reason for exam:->>[address], COVID19 evaluate for PE, please call  [phone] if significant results  Reason for Exam: Positive For Covid-19 (c/o being SOB, denies any other  symptoms)    FINDINGS:  No intimal flap seen in the aorta. No embolus is seen in the central, right, or left main pulmonary artery. Small pulmonary embolus is seen in the upper pulmonary artery. Small embolus  is also seen in a peripheral branch of the right lower lobe artery. In the right upper lobe patchy ground-glass opacity is seen with interlobular  septal thickening. Patchy ground-glass opacity is also seen in the right  middle lobe and right lower lobe with septal thickening. In the left upper lobe patchy ground-glass opacity seen with interlobular  septal thickening. Similar-appearing pattern and patchy ground-glass opacity  and septal thickening is seen in left lower lobe. Adrenal glands appear  normal    Fatty infiltration of the liver is seen. No hydronephrosis on the right and left. Punctate calculi are seen in the left kidney      Impression Small pulmonary embolus left upper lobe and right lower lobe. Bilateral airspace disease denoted by patchy ground glass opacity and  interlobular septal thickening, likely due to the related history of COVID-19  infection. Fatty liver. RECOMMENDATIONS:  Results discussed with Dr. Denisha Manley. Willis Becerra MD at 7:17 am on  2/5/2021         CXR PA/LAT: No results found for this or any previous visit.     CXR portable:   Results for orders placed during the hospital encounter of 02/04/21   XR CHEST PORTABLE    Narrative EXAMINATION:  ONE XRAY VIEW OF THE CHEST    2/4/2021 2:54 pm    COMPARISON:  None. HISTORY:  ORDERING SYSTEM PROVIDED HISTORY: sob  TECHNOLOGIST PROVIDED HISTORY:  Reason for exam:->sob  Reason for Exam: sob  Acuity: Acute  Type of Exam: Initial    FINDINGS:  Heart size is normal.  Bilateral airspace opacities. No pneumothorax. No  pleural effusion. Impression Bilateral airspace opacities could represent pulmonary edema, multifocal  bacterial pneumonia or atypical pneumonia                  This note was transcribed using [id number] [employer]. Please disregard any translational errors.     Thank you for this consult,    Latonia Blum [address] [facility]

## 2021-02-08 NOTE — CARE COORDINATION
Chart reviewed. Prior to planned call. Pt noted to be admitted for Hypoxia. No further ED f.u calls at this time. [facility] ED f/u call episode closed. FU appts/Provider:    No future appointments.

## 2021-02-09 LAB
A/G RATIO: 1 (ref 1.1–2.2)
ALBUMIN SERPL-MCNC: 3.4 G/DL (ref 3.4–5)
ALP BLD-CCNC: 334 U/L (ref 40–129)
ALT SERPL-CCNC: 134 U/L (ref 10–40)
ANION GAP SERPL CALCULATED.3IONS-SCNC: 14 MMOL/L (ref 3–16)
AST SERPL-CCNC: 45 U/L (ref 15–37)
BASOPHILS ABSOLUTE: 0 K/UL (ref 0–0.2)
BASOPHILS RELATIVE PERCENT: 0.1 %
BILIRUB SERPL-MCNC: 0.4 MG/DL (ref 0–1)
BUN BLDV-MCNC: 31 MG/DL (ref 7–20)
CALCIUM SERPL-MCNC: 8.8 MG/DL (ref 8.3–10.6)
CHLORIDE BLD-SCNC: 98 MMOL/L (ref 99–110)
CO2: 23 MMOL/L (ref 21–32)
CREAT SERPL-MCNC: 1.1 MG/DL (ref 0.8–1.3)
EOSINOPHILS ABSOLUTE: 0 K/UL (ref 0–0.6)
EOSINOPHILS RELATIVE PERCENT: 0 %
GFR AFRICAN AMERICAN: >60
GFR NON-AFRICAN AMERICAN: >60
GLOBULIN: 3.3 G/DL
GLUCOSE BLD-MCNC: 197 MG/DL (ref 70–99)
GLUCOSE BLD-MCNC: 365 MG/DL (ref 70–99)
GLUCOSE BLD-MCNC: 384 MG/DL (ref 70–99)
GLUCOSE BLD-MCNC: 427 MG/DL (ref 70–99)
GLUCOSE BLD-MCNC: 467 MG/DL (ref 70–99)
HCT VFR BLD CALC: 41.8 % (ref 40.5–52.5)
HEMOGLOBIN: 14.2 G/DL (ref 13.5–17.5)
LYMPHOCYTES ABSOLUTE: 1.1 K/UL (ref 1–5.1)
LYMPHOCYTES RELATIVE PERCENT: 7.5 %
MCH RBC QN AUTO: 31.2 PG (ref 26–34)
MCHC RBC AUTO-ENTMCNC: 34 G/DL (ref 31–36)
MCV RBC AUTO: 91.7 FL (ref 80–100)
MONOCYTES ABSOLUTE: 1.2 K/UL (ref 0–1.3)
MONOCYTES RELATIVE PERCENT: 8 %
NEUTROPHILS ABSOLUTE: 12.8 K/UL (ref 1.7–7.7)
NEUTROPHILS RELATIVE PERCENT: 84.4 %
PDW BLD-RTO: 13.9 % (ref 12.4–15.4)
PERFORMED ON: ABNORMAL
PLATELET # BLD: 200 K/UL (ref 135–450)
PMV BLD AUTO: 9.2 FL (ref 5–10.5)
POTASSIUM REFLEX MAGNESIUM: 4.6 MMOL/L (ref 3.5–5.1)
RBC # BLD: 4.55 M/UL (ref 4.2–5.9)
SODIUM BLD-SCNC: 135 MMOL/L (ref 136–145)
TOTAL PROTEIN: 6.7 G/DL (ref 6.4–8.2)
WBC # BLD: 15.2 K/UL (ref 4–11)

## 2021-02-09 PROCEDURE — 36415 COLL VENOUS BLD VENIPUNCTURE: CPT

## 2021-02-09 PROCEDURE — 2580000003 HC RX 258: Performed by: INTERNAL MEDICINE

## 2021-02-09 PROCEDURE — 2700000000 HC OXYGEN THERAPY PER DAY

## 2021-02-09 PROCEDURE — 6360000002 HC RX W HCPCS: Performed by: INTERNAL MEDICINE

## 2021-02-09 PROCEDURE — 94761 N-INVAS EAR/PLS OXIMETRY MLT: CPT

## 2021-02-09 PROCEDURE — 85025 COMPLETE CBC W/AUTO DIFF WBC: CPT

## 2021-02-09 PROCEDURE — 6370000000 HC RX 637 (ALT 250 FOR IP): Performed by: INTERNAL MEDICINE

## 2021-02-09 PROCEDURE — 2060000000 HC ICU INTERMEDIATE R&B

## 2021-02-09 PROCEDURE — 80053 COMPREHEN METABOLIC PANEL: CPT

## 2021-02-09 RX ORDER — INSULIN GLARGINE 100 [IU]/ML
15 INJECTION, SOLUTION SUBCUTANEOUS 2 TIMES DAILY
Status: DISCONTINUED | OUTPATIENT
Start: 2021-02-09 | End: 2021-02-09

## 2021-02-09 RX ORDER — DEXAMETHASONE 6 MG/1
6 TABLET ORAL DAILY
Status: DISCONTINUED | OUTPATIENT
Start: 2021-02-10 | End: 2021-02-12 | Stop reason: HOSPADM

## 2021-02-09 RX ORDER — AZITHROMYCIN 500 MG/1
500 TABLET, FILM COATED ORAL EVERY EVENING
Status: DISCONTINUED | OUTPATIENT
Start: 2021-02-09 | End: 2021-02-09

## 2021-02-09 RX ORDER — INSULIN GLARGINE 100 [IU]/ML
10 INJECTION, SOLUTION SUBCUTANEOUS 2 TIMES DAILY
Status: DISCONTINUED | OUTPATIENT
Start: 2021-02-09 | End: 2021-02-12

## 2021-02-09 RX ADMIN — SODIUM CHLORIDE, PRESERVATIVE FREE 10 ML: 5 INJECTION INTRAVENOUS at 10:23

## 2021-02-09 RX ADMIN — APIXABAN 10 MG: 5 TABLET, FILM COATED ORAL at 22:31

## 2021-02-09 RX ADMIN — INSULIN LISPRO 5 UNITS: 100 INJECTION, SOLUTION INTRAVENOUS; SUBCUTANEOUS at 09:54

## 2021-02-09 RX ADMIN — INSULIN HUMAN 5 UNITS: 100 INJECTION, SOLUTION PARENTERAL at 09:54

## 2021-02-09 RX ADMIN — INSULIN LISPRO 5 UNITS: 100 INJECTION, SOLUTION INTRAVENOUS; SUBCUTANEOUS at 13:30

## 2021-02-09 RX ADMIN — DEXAMETHASONE SODIUM PHOSPHATE 20 MG: 4 INJECTION, SOLUTION INTRAMUSCULAR; INTRAVENOUS at 11:26

## 2021-02-09 RX ADMIN — INSULIN LISPRO 5 UNITS: 100 INJECTION, SOLUTION INTRAVENOUS; SUBCUTANEOUS at 18:04

## 2021-02-09 RX ADMIN — INSULIN LISPRO 6 UNITS: 100 INJECTION, SOLUTION INTRAVENOUS; SUBCUTANEOUS at 09:54

## 2021-02-09 RX ADMIN — INSULIN LISPRO 3 UNITS: 100 INJECTION, SOLUTION INTRAVENOUS; SUBCUTANEOUS at 22:32

## 2021-02-09 RX ADMIN — INSULIN GLARGINE 10 UNITS: 100 INJECTION, SOLUTION SUBCUTANEOUS at 22:31

## 2021-02-09 RX ADMIN — INSULIN GLARGINE 15 UNITS: 100 INJECTION, SOLUTION SUBCUTANEOUS at 09:54

## 2021-02-09 RX ADMIN — ENOXAPARIN SODIUM 80 MG: 80 INJECTION SUBCUTANEOUS at 09:54

## 2021-02-09 RX ADMIN — INSULIN LISPRO 6 UNITS: 100 INJECTION, SOLUTION INTRAVENOUS; SUBCUTANEOUS at 18:04

## 2021-02-09 RX ADMIN — INSULIN LISPRO 1 UNITS: 100 INJECTION, SOLUTION INTRAVENOUS; SUBCUTANEOUS at 13:30

## 2021-02-09 RX ADMIN — Medication 6000 UNITS: at 09:54

## 2021-02-09 ASSESSMENT — PAIN SCALES - GENERAL: PAINLEVEL_OUTOF10: 0

## 2021-02-09 NOTE — PLAN OF CARE
Problem: Airway Clearance - Ineffective  Goal: Achieve or maintain patent airway  Outcome: Ongoing     Problem: Gas Exchange - Impaired  Goal: Absence of hypoxia  Outcome: Ongoing  Goal: Promote optimal lung function  Outcome: Ongoing     Problem: Breathing Pattern - Ineffective  Goal: Ability to achieve and maintain a regular respiratory rate  Outcome: Ongoing     Problem:  Body Temperature -  Risk of, Imbalanced  Goal: Ability to maintain a body temperature within defined limits  Outcome: Ongoing  Goal: Will regain or maintain usual level of consciousness  Outcome: Ongoing  Goal: Complications related to the disease process, condition or treatment will be avoided or minimized  Outcome: Ongoing     Problem: Isolation Precautions - Risk of Spread of Infection  Goal: Prevent transmission of infection  Outcome: Ongoing     Problem: Nutrition Deficits  Goal: Optimize nutritional status  Outcome: Ongoing     Problem: Risk for Fluid Volume Deficit  Goal: Maintain normal heart rhythm  Outcome: Ongoing  Goal: Maintain absence of muscle cramping  Outcome: Ongoing  Goal: Maintain normal serum potassium, sodium, calcium, phosphorus, and pH  Outcome: Ongoing     Problem: Loneliness or Risk for Loneliness  Goal: Demonstrate positive use of time alone when socialization is not possible  Outcome: Ongoing     Problem: Patient Education: Go to Patient Education Activity  Goal: Patient/Family Education  Outcome: Ongoing     Problem: Skin Integrity:  Goal: Will show no infection signs and symptoms  Description: Will show no infection signs and symptoms  Outcome: Ongoing  Goal: Absence of new skin breakdown  Description: Absence of new skin breakdown  Outcome: Ongoing

## 2021-02-09 NOTE — FLOWSHEET NOTE
Pt with 9 beats of v-tach. Pt appears to sleeping. Respirations easy. Secure message sent to hospitalist regarding v-tach. No new orders received.

## 2021-02-09 NOTE — PROGRESS NOTES
Hospitalist Progress Note      PCP: No primary care provider on file. Date of Admission: 2/4/2021    Chief Complaint: shortness of breath    Hospital Course:   62 y.o. male Yogesh Godfrey with past medical hypertension, prediabetes  Presents with shortness of breath, coughing due to COVID-19. History obtained using  iPad  He had a sore throat last week, got COVID-19 tested on January 26 which came back positive. He has been feeling well until 3 days ago when he started having shortness of breath, progressively worse, now only to take 10-15 steps before needing rest and getting significantly winded. Subjective:     Feels a lot better.  On 4l/min this am.   + this morning         Medications:  Reviewed    Infusion Medications    dextrose       Scheduled Medications    insulin glargine  15 Units Subcutaneous BID    insulin lispro  5 Units Subcutaneous TID WC    azithromycin  500 mg Oral QPM    enoxaparin  1 mg/kg Subcutaneous BID    dexamethasone  20 mg Intravenous Q24H    insulin lispro  0-6 Units Subcutaneous TID WC    insulin lispro  0-3 Units Subcutaneous Nightly    Vitamin D  6,000 Units Oral Daily    Followed by   Lary Lynch ON 2/11/2021] Vitamin D  2,000 Units Oral Daily    sodium chloride flush  10 mL Intravenous 2 times per day    cefTRIAXone (ROCEPHIN) IV  1,000 mg Intravenous Q24H     PRN Meds: glucose, dextrose, glucagon (rDNA), dextrose, guaiFENesin-dextromethorphan, sodium chloride flush, acetaminophen **OR** acetaminophen, sodium chloride      Intake/Output Summary (Last 24 hours) at 2/9/2021 1254  Last data filed at 2/9/2021 0530  Gross per 24 hour   Intake 300 ml   Output 2 ml   Net 298 ml       Physical Exam Performed:    /78   Pulse 50   Temp 98.2 °F (36.8 °C) (Oral)   Resp 20   Ht 5' 7\" (1.702 m)   Wt 164 lb 7.4 oz (74.6 kg)   SpO2 97%   BMI 25.76 kg/m²     General appearance: Ill-appearing, lying in bed comfortably  HEENT:  Normal cephalic, atraumatic without obvious deformity. Pupils equal, round, and reactive to light. Extra ocular muscles intact. Conjunctivae/corneas clear. Neck: Supple, with full range of motion. No jugular venous distention. Trachea midline. Respiratory:  Mildly decreased breathing sounds bilaterally  Cardiovascular:  Regular rate and rhythm with normal S1/S2 without murmurs, rubs or gallops. Abdomen: Soft, non-tender, non-distended with normal bowel sounds. Musculoskeletal:  No clubbing, cyanosis or edema bilaterally. Full range of motion without deformity. Skin: Skin color, texture, turgor normal.  No rashes or lesions. Neurologic:  Neurovascularly intact without any focal sensory/motor deficits. Cranial nerves: II-XII intact, grossly non-focal.  Psychiatric:  Alert and oriented, thought content appropriate, normal insight  Capillary Refill: Brisk,< 3 seconds   Peripheral Pulses: +2 palpable, equal bilaterally          Labs:   Recent Labs     02/07/21  0604 02/08/21  0629 02/09/21  0525   WBC 19.8* 15.1* 15.2*   HGB 13.7 13.6 14.2   HCT 41.3 40.5 41.8    230 200     Recent Labs     02/07/21  0604 02/08/21  0629 02/09/21  0525    133* 135*   K 4.2 4.7 4.6    98* 98*   CO2 25 24 23   BUN 24* 27* 31*   CREATININE 1.0 1.0 1.1   CALCIUM 9.0 9.0 8.8     Recent Labs     02/07/21  0604 02/08/21  0629 02/09/21  0525   AST 54* 50* 45*   * 140* 134*   BILITOT 0.4 0.4 0.4   ALKPHOS 384* 354* 334*     No results for input(s): INR in the last 72 hours. No results for input(s): Towana Stephani in the last 72 hours. Urinalysis:      Lab Results   Component Value Date    NITRU Negative 02/04/2021    WBCUA 1 02/04/2021    RBCUA 3 02/04/2021    BLOODU Negative 02/04/2021    SPECGRAV 1.016 02/04/2021    GLUCOSEU Negative 02/04/2021       Radiology:  XR CHEST PORTABLE   Final Result   Scattered pulmonary opacities consistent with pneumonia.          CT CHEST PULMONARY EMBOLISM W CONTRAST   Final Result   Small pulmonary embolus left upper lobe and right lower lobe. Bilateral airspace disease denoted by patchy ground glass opacity and   interlobular septal thickening, likely due to the related history of COVID-19   infection. Fatty liver. RECOMMENDATIONS:   Results discussed with Dr. Adry Srinivasan. Alexsander Woodson MD at 7:17 am on   2/5/2021         XR CHEST PORTABLE   Final Result   Bilateral airspace opacities could represent pulmonary edema, multifocal   bacterial pneumonia or atypical pneumonia                 Assessment/Plan:    Active Hospital Problems    Diagnosis Date Noted    Acute respiratory failure with hypoxia ([facility] [address] [J96.01]     Pneumonia due to COVID-19 virus [U07.1, J12.82]     Multifocal pneumonia [J18.9]     Multiple subsegmental pulmonary emboli without acute cor pulmonale (HCC) [I26.94]     Sepsis due to severe acute respiratory syndrome coronavirus 2 (SARS-CoV-2) (HCC) [U07.1, A41.89] 02/04/2021    Type 2 diabetes mellitus ([facility] [address] [E11.9] 03/18/2019         Patient is a 81-year-old male with past medical history of hypertension diabetes who presents to the hospital for shortness of breath    Assessment\        Sepsis due to COVID-19 pneumonia  Acute hypoxic respiratory failure secondary to COVID-19 pneumonia  S/p remdesivir. Decadron 20IV - to 6 PO daily starting tomorrow. Stop zithro and rocephin. O2 from NRB to 4l/min today. Bilateral small PE  Lovenox 80BID - to eliquis on 2/9/2021. Hypertension      Diabetes mellitus 2 with steroid hyperglycemia. Increase lantus. Humalog IV x1 this am.   Add prandial bolus. DVT Prophylaxis: Lovenox 1 mg/kg twice daily  Diet: DIET CARB CONTROL; Carb Control: 4 carb choices (60 gms)/meal  Code Status: Full Code    PT/OT Eval Status: ambulatory. Dispo - cc.        Nuvia Kim MD  Hospitalist

## 2021-02-10 LAB
A/G RATIO: 0.9 (ref 1.1–2.2)
ALBUMIN SERPL-MCNC: 3.1 G/DL (ref 3.4–5)
ALP BLD-CCNC: 305 U/L (ref 40–129)
ALT SERPL-CCNC: 101 U/L (ref 10–40)
ANION GAP SERPL CALCULATED.3IONS-SCNC: 13 MMOL/L (ref 3–16)
AST SERPL-CCNC: 34 U/L (ref 15–37)
BASOPHILS ABSOLUTE: 0 K/UL (ref 0–0.2)
BASOPHILS RELATIVE PERCENT: 0.2 %
BILIRUB SERPL-MCNC: 0.3 MG/DL (ref 0–1)
BUN BLDV-MCNC: 28 MG/DL (ref 7–20)
CALCIUM SERPL-MCNC: 8.7 MG/DL (ref 8.3–10.6)
CHLORIDE BLD-SCNC: 94 MMOL/L (ref 99–110)
CO2: 20 MMOL/L (ref 21–32)
CREAT SERPL-MCNC: 1 MG/DL (ref 0.8–1.3)
EKG ATRIAL RATE: 47 BPM
EKG DIAGNOSIS: NORMAL
EKG P AXIS: 46 DEGREES
EKG P-R INTERVAL: 134 MS
EKG Q-T INTERVAL: 492 MS
EKG QRS DURATION: 84 MS
EKG QTC CALCULATION (BAZETT): 435 MS
EKG R AXIS: -11 DEGREES
EKG T AXIS: 33 DEGREES
EKG VENTRICULAR RATE: 47 BPM
EOSINOPHILS ABSOLUTE: 0 K/UL (ref 0–0.6)
EOSINOPHILS RELATIVE PERCENT: 0 %
GFR AFRICAN AMERICAN: >60
GFR NON-AFRICAN AMERICAN: >60
GLOBULIN: 3.3 G/DL
GLUCOSE BLD-MCNC: 212 MG/DL (ref 70–99)
GLUCOSE BLD-MCNC: 219 MG/DL (ref 70–99)
GLUCOSE BLD-MCNC: 246 MG/DL (ref 70–99)
GLUCOSE BLD-MCNC: 266 MG/DL (ref 70–99)
GLUCOSE BLD-MCNC: 290 MG/DL (ref 70–99)
GLUCOSE BLD-MCNC: 298 MG/DL (ref 70–99)
GLUCOSE BLD-MCNC: 313 MG/DL (ref 70–99)
GLUCOSE BLD-MCNC: 356 MG/DL (ref 70–99)
GLUCOSE BLD-MCNC: 420 MG/DL (ref 70–99)
GLUCOSE BLD-MCNC: 475 MG/DL (ref 70–99)
GLUCOSE BLD-MCNC: 479 MG/DL (ref 70–99)
HCT VFR BLD CALC: 41.1 % (ref 40.5–52.5)
HEMOGLOBIN: 13.8 G/DL (ref 13.5–17.5)
LYMPHOCYTES ABSOLUTE: 1.2 K/UL (ref 1–5.1)
LYMPHOCYTES RELATIVE PERCENT: 8.4 %
MCH RBC QN AUTO: 30.9 PG (ref 26–34)
MCHC RBC AUTO-ENTMCNC: 33.4 G/DL (ref 31–36)
MCV RBC AUTO: 92.4 FL (ref 80–100)
MONOCYTES ABSOLUTE: 1.2 K/UL (ref 0–1.3)
MONOCYTES RELATIVE PERCENT: 8.3 %
NEUTROPHILS ABSOLUTE: 11.9 K/UL (ref 1.7–7.7)
NEUTROPHILS RELATIVE PERCENT: 83.1 %
PDW BLD-RTO: 14 % (ref 12.4–15.4)
PERFORMED ON: ABNORMAL
PLATELET # BLD: 215 K/UL (ref 135–450)
PMV BLD AUTO: 9.2 FL (ref 5–10.5)
POTASSIUM REFLEX MAGNESIUM: 4.7 MMOL/L (ref 3.5–5.1)
PROCALCITONIN: 0.07 NG/ML (ref 0–0.15)
RBC # BLD: 4.45 M/UL (ref 4.2–5.9)
SODIUM BLD-SCNC: 127 MMOL/L (ref 136–145)
TOTAL PROTEIN: 6.4 G/DL (ref 6.4–8.2)
TSH SERPL DL<=0.05 MIU/L-ACNC: 0.55 UIU/ML (ref 0.27–4.2)
WBC # BLD: 14.4 K/UL (ref 4–11)

## 2021-02-10 PROCEDURE — 84145 PROCALCITONIN (PCT): CPT

## 2021-02-10 PROCEDURE — 6370000000 HC RX 637 (ALT 250 FOR IP): Performed by: INTERNAL MEDICINE

## 2021-02-10 PROCEDURE — 2580000003 HC RX 258: Performed by: INTERNAL MEDICINE

## 2021-02-10 PROCEDURE — 36415 COLL VENOUS BLD VENIPUNCTURE: CPT

## 2021-02-10 PROCEDURE — 84443 ASSAY THYROID STIM HORMONE: CPT

## 2021-02-10 PROCEDURE — 80053 COMPREHEN METABOLIC PANEL: CPT

## 2021-02-10 PROCEDURE — 85025 COMPLETE CBC W/AUTO DIFF WBC: CPT

## 2021-02-10 PROCEDURE — 2060000000 HC ICU INTERMEDIATE R&B

## 2021-02-10 PROCEDURE — 94761 N-INVAS EAR/PLS OXIMETRY MLT: CPT

## 2021-02-10 PROCEDURE — 93005 ELECTROCARDIOGRAM TRACING: CPT | Performed by: INTERNAL MEDICINE

## 2021-02-10 PROCEDURE — 93010 ELECTROCARDIOGRAM REPORT: CPT | Performed by: INTERNAL MEDICINE

## 2021-02-10 PROCEDURE — 2700000000 HC OXYGEN THERAPY PER DAY

## 2021-02-10 RX ORDER — 0.9 % SODIUM CHLORIDE 0.9 %
1000 INTRAVENOUS SOLUTION INTRAVENOUS ONCE
Status: COMPLETED | OUTPATIENT
Start: 2021-02-10 | End: 2021-02-10

## 2021-02-10 RX ADMIN — INSULIN LISPRO 6 UNITS: 100 INJECTION, SOLUTION INTRAVENOUS; SUBCUTANEOUS at 08:24

## 2021-02-10 RX ADMIN — INSULIN LISPRO 5 UNITS: 100 INJECTION, SOLUTION INTRAVENOUS; SUBCUTANEOUS at 18:15

## 2021-02-10 RX ADMIN — APIXABAN 10 MG: 5 TABLET, FILM COATED ORAL at 08:23

## 2021-02-10 RX ADMIN — SODIUM CHLORIDE 1000 ML: 9 INJECTION, SOLUTION INTRAVENOUS at 09:34

## 2021-02-10 RX ADMIN — INSULIN GLARGINE 10 UNITS: 100 INJECTION, SOLUTION SUBCUTANEOUS at 20:37

## 2021-02-10 RX ADMIN — DEXAMETHASONE 6 MG: 6 TABLET ORAL at 08:23

## 2021-02-10 RX ADMIN — INSULIN LISPRO 2 UNITS: 100 INJECTION, SOLUTION INTRAVENOUS; SUBCUTANEOUS at 18:15

## 2021-02-10 RX ADMIN — INSULIN LISPRO 5 UNITS: 100 INJECTION, SOLUTION INTRAVENOUS; SUBCUTANEOUS at 12:00

## 2021-02-10 RX ADMIN — INSULIN HUMAN 5 UNITS: 100 INJECTION, SOLUTION PARENTERAL at 09:34

## 2021-02-10 RX ADMIN — APIXABAN 10 MG: 5 TABLET, FILM COATED ORAL at 20:37

## 2021-02-10 RX ADMIN — INSULIN LISPRO 3 UNITS: 100 INJECTION, SOLUTION INTRAVENOUS; SUBCUTANEOUS at 12:00

## 2021-02-10 RX ADMIN — INSULIN GLARGINE 10 UNITS: 100 INJECTION, SOLUTION SUBCUTANEOUS at 08:24

## 2021-02-10 RX ADMIN — INSULIN LISPRO 2 UNITS: 100 INJECTION, SOLUTION INTRAVENOUS; SUBCUTANEOUS at 20:38

## 2021-02-10 RX ADMIN — SODIUM CHLORIDE, PRESERVATIVE FREE 10 ML: 5 INJECTION INTRAVENOUS at 02:57

## 2021-02-10 RX ADMIN — SODIUM CHLORIDE, PRESERVATIVE FREE 10 ML: 5 INJECTION INTRAVENOUS at 20:37

## 2021-02-10 RX ADMIN — Medication 6000 UNITS: at 08:22

## 2021-02-10 RX ADMIN — INSULIN LISPRO 5 UNITS: 100 INJECTION, SOLUTION INTRAVENOUS; SUBCUTANEOUS at 08:24

## 2021-02-10 ASSESSMENT — PAIN SCALES - GENERAL
PAINLEVEL_OUTOF10: 0

## 2021-02-10 NOTE — CARE COORDINATION
INITIAL CASE MANAGEMENT ASSESSMENT    Reviewed chart, unable to meet with patient due to isolation status. Call to patient's room, spoke with patient over the phone via  services ID #[id number] to assess possible discharge needs. Explained Case Management role/services. Living Situation: Confirmed address, lives w/ son in bilevel home    ADLs: Independent     DME: None    PT/OT Recs: Not ordered, patient independent in room     Active Services: None     Transportation: Active  - son will transport home     Medications: Uses [facility] on [address] - no trouble purchasing meds now that he is enrolled in specialty program through his [employer] insurance    PCP: Bianca Salas (patient spelled name out but unable to find this physician in computer?) -- has 1st appointment on the 17th      HD/PD: n/a    PLAN/COMMENTS: Patient says he is independent. Denies needing assistance at home. Son will transport home. Monitor for home oxygen needs. CM provided contact information for patient or family to call with any questions. CM will follow and assist as needed.   Electronically signed by Karine Guzman RN Case Management [phone] on 2/10/2021 at 10:39 AM

## 2021-02-10 NOTE — PROGRESS NOTES
round, and reactive to light. Extra ocular muscles intact. Conjunctivae/corneas clear. Neck: Supple, with full range of motion. No jugular venous distention. Trachea midline. Respiratory:  Mildly decreased breathing sounds bilaterally  Cardiovascular:  Regular rate and rhythm with normal S1/S2 without murmurs, rubs or gallops. Abdomen: Soft, non-tender, non-distended with normal bowel sounds. Musculoskeletal:  No clubbing, cyanosis or edema bilaterally. Full range of motion without deformity. Skin: Skin color, texture, turgor normal.  No rashes or lesions. Neurologic:  Neurovascularly intact without any focal sensory/motor deficits. Cranial nerves: II-XII intact, grossly non-focal.  Psychiatric:  Alert and oriented, thought content appropriate, normal insight  Capillary Refill: Brisk,< 3 seconds   Peripheral Pulses: +2 palpable, equal bilaterally          Labs:   Recent Labs     02/08/21  0629 02/09/21  0525 02/10/21  0527   WBC 15.1* 15.2* 14.4*   HGB 13.6 14.2 13.8   HCT 40.5 41.8 41.1    200 215     Recent Labs     02/08/21  0629 02/09/21  0525 02/10/21  0527   * 135* 127*   K 4.7 4.6 4.7   CL 98* 98* 94*   CO2 24 23 20*   BUN 27* 31* 28*   CREATININE 1.0 1.1 1.0   CALCIUM 9.0 8.8 8.7     Recent Labs     02/08/21  0629 02/09/21  0525 02/10/21  0527   AST 50* 45* 34   * 134* 101*   BILITOT 0.4 0.4 0.3   ALKPHOS 354* 334* 305*     No results for input(s): INR in the last 72 hours. No results for input(s): Charmaine Gilman in the last 72 hours. Urinalysis:      Lab Results   Component Value Date    NITRU Negative 02/04/2021    WBCUA 1 02/04/2021    RBCUA 3 02/04/2021    BLOODU Negative 02/04/2021    SPECGRAV 1.016 02/04/2021    GLUCOSEU Negative 02/04/2021       Radiology:  XR CHEST PORTABLE   Final Result   Scattered pulmonary opacities consistent with pneumonia.          CT CHEST PULMONARY EMBOLISM W CONTRAST   Final Result   Small pulmonary embolus left upper lobe and right lower lobe.      Bilateral airspace disease denoted by patchy ground glass opacity and   interlobular septal thickening, likely due to the related history of COVID-19   infection. Fatty liver. RECOMMENDATIONS:   Results discussed with Dr. Deysi Uribe. Nadia Lainez MD at 7:17 am on   2/5/2021         XR CHEST PORTABLE   Final Result   Bilateral airspace opacities could represent pulmonary edema, multifocal   bacterial pneumonia or atypical pneumonia                 Assessment/Plan:    Active Hospital Problems    Diagnosis Date Noted    Acute respiratory failure with hypoxia ([facility] [J96.01]     Pneumonia due to COVID-19 virus [U07.1, J12.82]     Multifocal pneumonia [J18.9]     Multiple subsegmental pulmonary emboli without acute cor pulmonale (HCC) [I26.94]     Sepsis due to severe acute respiratory syndrome coronavirus 2 (SARS-CoV-2) (HCC) [U07.1, A41.89] 02/04/2021    Type 2 diabetes mellitus ([facility] [E11.9] 03/18/2019         Patient is a 21-year-old male with past medical history of hypertension diabetes who presents to the hospital for shortness of breath    Assessment\        Sepsis due to COVID-19 pneumonia  Acute hypoxic respiratory failure secondary to COVID-19 pneumonia  S/p remdesivir. Decadron 20IV - to 6 PO daily starting today. Stop zithro and rocephin. O2 from NRB to 4l/min to RA today. Bilateral small PE  Lovenox 80BID - to eliquis on 2/9/2021. Hypertension       Diabetes mellitus 2 with steroid hyperglycemia. Increase lantus. Humulin R IV repeat today   Cont prandial bolus. IVF bolus today. DVT Prophylaxis: Lovenox 1 mg/kg twice daily to eliquis. Diet: DIET CARB CONTROL; Carb Control: 4 carb choices (60 gms)/meal  Code Status: Full Code    PT/OT Eval Status: ambulatory. Dispo - cc.        Lucian Ayala MD  Hospitalist

## 2021-02-10 NOTE — PLAN OF CARE
Pt will maintain absence of respiratory complications. Oxygen saturations >90% at all times, weaned patient to room air and monitoring with continuous pulse ox. Patient ambulated in room as tolerated. Diabetes education provided today: discussed the necessity of close monitoring of POC glucose d/t use of steroids that can cause abnormal levels. Pt able to verbalize understanding of and adherence to nutritional guidelines/dietary allowances or restrictions.

## 2021-02-11 LAB
A/G RATIO: 1 (ref 1.1–2.2)
ALBUMIN SERPL-MCNC: 3.3 G/DL (ref 3.4–5)
ALP BLD-CCNC: 268 U/L (ref 40–129)
ALT SERPL-CCNC: 81 U/L (ref 10–40)
ANION GAP SERPL CALCULATED.3IONS-SCNC: 12 MMOL/L (ref 3–16)
AST SERPL-CCNC: 20 U/L (ref 15–37)
BASOPHILS ABSOLUTE: 0.1 K/UL (ref 0–0.2)
BASOPHILS RELATIVE PERCENT: 0.4 %
BILIRUB SERPL-MCNC: 0.5 MG/DL (ref 0–1)
BUN BLDV-MCNC: 27 MG/DL (ref 7–20)
CALCIUM SERPL-MCNC: 8.8 MG/DL (ref 8.3–10.6)
CHLORIDE BLD-SCNC: 96 MMOL/L (ref 99–110)
CO2: 23 MMOL/L (ref 21–32)
CREAT SERPL-MCNC: 1 MG/DL (ref 0.8–1.3)
EOSINOPHILS ABSOLUTE: 0 K/UL (ref 0–0.6)
EOSINOPHILS RELATIVE PERCENT: 0.1 %
GFR AFRICAN AMERICAN: >60
GFR NON-AFRICAN AMERICAN: >60
GLOBULIN: 3.3 G/DL
GLUCOSE BLD-MCNC: 158 MG/DL (ref 70–99)
GLUCOSE BLD-MCNC: 214 MG/DL (ref 70–99)
GLUCOSE BLD-MCNC: 258 MG/DL (ref 70–99)
GLUCOSE BLD-MCNC: 319 MG/DL (ref 70–99)
GLUCOSE BLD-MCNC: 374 MG/DL (ref 70–99)
HCT VFR BLD CALC: 41.1 % (ref 40.5–52.5)
HEMOGLOBIN: 14.3 G/DL (ref 13.5–17.5)
LV EF: 60 %
LVEF MODALITY: NORMAL
LYMPHOCYTES ABSOLUTE: 2.1 K/UL (ref 1–5.1)
LYMPHOCYTES RELATIVE PERCENT: 14.2 %
MCH RBC QN AUTO: 31.7 PG (ref 26–34)
MCHC RBC AUTO-ENTMCNC: 34.8 G/DL (ref 31–36)
MCV RBC AUTO: 91.2 FL (ref 80–100)
MONOCYTES ABSOLUTE: 1.4 K/UL (ref 0–1.3)
MONOCYTES RELATIVE PERCENT: 9.5 %
NEUTROPHILS ABSOLUTE: 11.4 K/UL (ref 1.7–7.7)
NEUTROPHILS RELATIVE PERCENT: 75.8 %
PDW BLD-RTO: 13.6 % (ref 12.4–15.4)
PERFORMED ON: ABNORMAL
PLATELET # BLD: 214 K/UL (ref 135–450)
PMV BLD AUTO: 9.2 FL (ref 5–10.5)
POTASSIUM REFLEX MAGNESIUM: 4.2 MMOL/L (ref 3.5–5.1)
RBC # BLD: 4.51 M/UL (ref 4.2–5.9)
SODIUM BLD-SCNC: 131 MMOL/L (ref 136–145)
TOTAL PROTEIN: 6.6 G/DL (ref 6.4–8.2)
WBC # BLD: 15 K/UL (ref 4–11)

## 2021-02-11 PROCEDURE — 2060000000 HC ICU INTERMEDIATE R&B

## 2021-02-11 PROCEDURE — 94761 N-INVAS EAR/PLS OXIMETRY MLT: CPT

## 2021-02-11 PROCEDURE — 2580000003 HC RX 258: Performed by: INTERNAL MEDICINE

## 2021-02-11 PROCEDURE — 36415 COLL VENOUS BLD VENIPUNCTURE: CPT

## 2021-02-11 PROCEDURE — 85025 COMPLETE CBC W/AUTO DIFF WBC: CPT

## 2021-02-11 PROCEDURE — 93306 TTE W/DOPPLER COMPLETE: CPT

## 2021-02-11 PROCEDURE — 6370000000 HC RX 637 (ALT 250 FOR IP): Performed by: INTERNAL MEDICINE

## 2021-02-11 PROCEDURE — 80053 COMPREHEN METABOLIC PANEL: CPT

## 2021-02-11 PROCEDURE — 99222 1ST HOSP IP/OBS MODERATE 55: CPT | Performed by: INTERNAL MEDICINE

## 2021-02-11 RX ORDER — SODIUM CHLORIDE 9 MG/ML
INJECTION, SOLUTION INTRAVENOUS CONTINUOUS
Status: ACTIVE | OUTPATIENT
Start: 2021-02-11 | End: 2021-02-11

## 2021-02-11 RX ADMIN — INSULIN LISPRO 1 UNITS: 100 INJECTION, SOLUTION INTRAVENOUS; SUBCUTANEOUS at 08:49

## 2021-02-11 RX ADMIN — INSULIN LISPRO 4 UNITS: 100 INJECTION, SOLUTION INTRAVENOUS; SUBCUTANEOUS at 17:53

## 2021-02-11 RX ADMIN — SODIUM CHLORIDE, PRESERVATIVE FREE 10 ML: 5 INJECTION INTRAVENOUS at 21:29

## 2021-02-11 RX ADMIN — APIXABAN 10 MG: 5 TABLET, FILM COATED ORAL at 21:28

## 2021-02-11 RX ADMIN — INSULIN LISPRO 3 UNITS: 100 INJECTION, SOLUTION INTRAVENOUS; SUBCUTANEOUS at 12:26

## 2021-02-11 RX ADMIN — Medication 2000 UNITS: at 08:52

## 2021-02-11 RX ADMIN — SODIUM CHLORIDE, PRESERVATIVE FREE 10 ML: 5 INJECTION INTRAVENOUS at 08:52

## 2021-02-11 RX ADMIN — INSULIN LISPRO 3 UNITS: 100 INJECTION, SOLUTION INTRAVENOUS; SUBCUTANEOUS at 21:28

## 2021-02-11 RX ADMIN — INSULIN LISPRO 5 UNITS: 100 INJECTION, SOLUTION INTRAVENOUS; SUBCUTANEOUS at 08:52

## 2021-02-11 RX ADMIN — APIXABAN 10 MG: 5 TABLET, FILM COATED ORAL at 08:49

## 2021-02-11 RX ADMIN — INSULIN LISPRO 5 UNITS: 100 INJECTION, SOLUTION INTRAVENOUS; SUBCUTANEOUS at 12:26

## 2021-02-11 RX ADMIN — INSULIN GLARGINE 10 UNITS: 100 INJECTION, SOLUTION SUBCUTANEOUS at 08:52

## 2021-02-11 RX ADMIN — INSULIN LISPRO 5 UNITS: 100 INJECTION, SOLUTION INTRAVENOUS; SUBCUTANEOUS at 17:53

## 2021-02-11 RX ADMIN — ACETAMINOPHEN 650 MG: 325 TABLET ORAL at 13:10

## 2021-02-11 RX ADMIN — INSULIN GLARGINE 10 UNITS: 100 INJECTION, SOLUTION SUBCUTANEOUS at 21:28

## 2021-02-11 RX ADMIN — SODIUM CHLORIDE: 9 INJECTION, SOLUTION INTRAVENOUS at 09:32

## 2021-02-11 RX ADMIN — DEXAMETHASONE 6 MG: 6 TABLET ORAL at 08:49

## 2021-02-11 ASSESSMENT — PAIN DESCRIPTION - PROGRESSION: CLINICAL_PROGRESSION: NOT CHANGED

## 2021-02-11 ASSESSMENT — PAIN DESCRIPTION - FREQUENCY: FREQUENCY: INTERMITTENT

## 2021-02-11 ASSESSMENT — PAIN SCALES - GENERAL
PAINLEVEL_OUTOF10: 3
PAINLEVEL_OUTOF10: 0

## 2021-02-11 ASSESSMENT — PAIN DESCRIPTION - LOCATION: LOCATION: FOOT

## 2021-02-11 ASSESSMENT — PAIN - FUNCTIONAL ASSESSMENT: PAIN_FUNCTIONAL_ASSESSMENT: ACTIVITIES ARE NOT PREVENTED

## 2021-02-11 NOTE — PLAN OF CARE
Problem: Airway Clearance - Ineffective  Goal: Achieve or maintain patent airway  Outcome: Ongoing     Problem: Gas Exchange - Impaired  Goal: Absence of hypoxia  Outcome: Ongoing  Goal: Promote optimal lung function  Outcome: Ongoing     Problem: Breathing Pattern - Ineffective  Goal: Ability to achieve and maintain a regular respiratory rate  Outcome: Ongoing     Problem:  Body Temperature -  Risk of, Imbalanced  Goal: Ability to maintain a body temperature within defined limits  Outcome: Ongoing  Goal: Will regain or maintain usual level of consciousness  Outcome: Ongoing     Problem: Isolation Precautions - Risk of Spread of Infection  Goal: Prevent transmission of infection  Outcome: Ongoing     Problem: Risk for Fluid Volume Deficit  Goal: Maintain normal heart rhythm  Outcome: Ongoing  Goal: Maintain absence of muscle cramping  Outcome: Ongoing     Problem: Fatigue  Goal: Verbalize increase energy and improved vitality  Outcome: Ongoing     Problem: Skin Integrity:  Goal: Will show no infection signs and symptoms  Description: Will show no infection signs and symptoms  Outcome: Ongoing  Goal: Absence of new skin breakdown  Description: Absence of new skin breakdown  Outcome: Ongoing     Problem: Falls - Risk of:  Goal: Will remain free from falls  Description: Will remain free from falls  Outcome: Ongoing  Goal: Absence of physical injury  Description: Absence of physical injury  Outcome: Ongoing

## 2021-02-11 NOTE — CONSULTS
[address]                                  CONSULTATION    PATIENT NAME: Neil Bradford                      :        1960  MED REC NO:   [id number]                          ROOM:       [id number]  ACCOUNT NO:   [de-identified]                           ADMIT DATE: 2021  PROVIDER:     Narinder Hernandez MD    CARDIOLOGY CONSULTATION    CONSULT DATE:  2021    HISTORY OF PRESENT ILLNESS:  This is a 81-year-old male with a history  of hypertension, prediabetes, has been admitted to the hospital for  hypoxic respiratory failure caused by his COVID pneumonia. During this  hospitalization, his heart rate has been found to be low in the 40 and  50 range leading to this cardiac consultation. He describes having no  dizziness, light headedness, syncope in the past or during this  admission. He had no fever chills, or rigors. The patient is currently  feeling much better with the treatment instituted while in the hospital.    REVIEW OF SYSTEMS:  Please see HPI. All other systems are reviewed and  they are negative. PAST MEDICAL HISTORY:  1. History of prediabetes. 2.  Hypertension. 3.  History of gout. SOCIAL HISTORY:  Denies any smoking or alcohol abuse. FAMILY HISTORY:  Negative for any early premature atherosclerosis. MEDICINES AND ALLERGIES:  Have been reviewed. PHYSICAL EXAMINATION:  VITAL SIGNS:  His pulse is 57 and regular, blood pressure is 100/59,  respirations are 16, oxygen saturation 96%. CONSTITUTIONAL:  He is alert, oriented. HEENT EXAMINATION:  Neck is supple. No JVD. No thyromegaly. No  lymphadenopathy. No icterus in the eyes or pale conjunctivae noted. LUNGS:  Largely clear to auscultation and palpation. HEART:  Shows regular rate and rhythm. No gallop, murmur, or rub. ABDOMEN:  Soft, nontender. Bowel sounds are present. EXTREMITIES:  Show no edema.   NEUROLOGICAL EXAMINATION:  Alert, oriented. Cranial nerves II through  XII intact. No focal deficits. SKIN:  Shows no rashes. LABORATORY DATA:  Sodium 131, potassium 4.2, chloride 96, bicarb 23, BUN  is 27, creatinine 1, albumin was 3.3. White count 1500, hemoglobin  14.3, and hematocrit is 41.1. EKG shows sinus bradycardia, no acute ST and T-wave changes noted, and  the patient's monitor strips also show episodes of sinus bradycardia. TSH is normal.    Echocardiogram showed normal LV function with no structural  abnormalities. IMPRESSION:  1. This is a 60-year-old male who has presented with COVID pneumonia,  has evidence of asymptomatic bradycardia. This is likely due to high  vagal tone and I do not see any other possibilities. His thyroid  profile is normal.  It is unlikely that COVID may have caused this  though it cannot be entirely excluded. 2.  COVID pneumonia with hypoxic respiratory failure, improving. 3.  Hypertension. Blood pressure stable. RECOMMENDATION:  Just continue to monitor the patient from cardiac  standpoint for another 24 hours. No need for a pacemaker at the present  time. I appreciate the opportunity to participate in the care of this pleasant  male. We will sign off on the patient unless the patient has any  further cardiac issues. Please do not hesitate to call us.         Dawn Arzate MD    D: 02/11/2021 13:50:35       T: 02/11/2021 15:02:54     [email or web address]  Job#: [id number]     Doc#: [id number]    CC:

## 2021-02-11 NOTE — PROGRESS NOTES
Hospitalist Progress Note      PCP: No primary care provider on file. Date of Admission: 2/4/2021    Chief Complaint: shortness of breath    Hospital Course:   62 y.o. male Aleyda Luna with past medical hypertension, prediabetes  Presents with shortness of breath, coughing due to COVID-19. History obtained using  iPad  He had a sore throat last week, got COVID-19 tested on January 26 which came back positive. He has been feeling well until 3 days ago when he started having shortness of breath, progressively worse, now only to take 10-15 steps before needing rest and getting significantly winded. Subjective: Intermittent bouts of hypoxia overnight. Overall doing much better. HR - as low as mid 30s overnight - asymptomatic, BP stable.        BG much better this am.           Medications:  Reviewed    Infusion Medications    sodium chloride 100 mL/hr at 02/11/21 0932    dextrose       Scheduled Medications    insulin lispro  5 Units Subcutaneous TID WC    insulin glargine  10 Units Subcutaneous BID    apixaban  10 mg Oral BID    dexamethasone  6 mg Oral Daily    insulin lispro  0-6 Units Subcutaneous TID WC    insulin lispro  0-3 Units Subcutaneous Nightly    Vitamin D  2,000 Units Oral Daily    sodium chloride flush  10 mL Intravenous 2 times per day     PRN Meds: glucose, dextrose, glucagon (rDNA), dextrose, guaiFENesin-dextromethorphan, sodium chloride flush, acetaminophen **OR** acetaminophen, sodium chloride      Intake/Output Summary (Last 24 hours) at 2/11/2021 1456  Last data filed at 2/11/2021 1450  Gross per 24 hour   Intake 540 ml   Output --   Net 540 ml       Physical Exam Performed:    BP (!) 100/59   Pulse 57   Temp 98.2 °F (36.8 °C) (Oral)   Resp 16   Ht 5' 7\" (1.702 m)   Wt 165 lb 12.6 oz (75.2 kg)   SpO2 97%   BMI 25.97 kg/m²     General appearance: Ill-appearing, lying in bed comfortably  HEENT:  Normal cephalic, atraumatic without obvious deformity. Pupils equal, round, and reactive to light. Extra ocular muscles intact. Conjunctivae/corneas clear. Neck: Supple, with full range of motion. No jugular venous distention. Trachea midline. Respiratory:  Mildly decreased breathing sounds bilaterally  Cardiovascular:  Regular rate and rhythm with normal S1/S2 without murmurs, rubs or gallops. Abdomen: Soft, non-tender, non-distended with normal bowel sounds. Musculoskeletal:  No clubbing, cyanosis or edema bilaterally. Full range of motion without deformity. Skin: Skin color, texture, turgor normal.  No rashes or lesions. Neurologic:  Neurovascularly intact without any focal sensory/motor deficits. Cranial nerves: II-XII intact, grossly non-focal.  Psychiatric:  Alert and oriented, thought content appropriate, normal insight  Capillary Refill: Brisk,< 3 seconds   Peripheral Pulses: +2 palpable, equal bilaterally          Labs:   Recent Labs     02/09/21  0525 02/10/21  0527 02/11/21  0556   WBC 15.2* 14.4* 15.0*   HGB 14.2 13.8 14.3   HCT 41.8 41.1 41.1    215 214     Recent Labs     02/09/21  0525 02/10/21  0527 02/11/21  0556   * 127* 131*   K 4.6 4.7 4.2   CL 98* 94* 96*   CO2 23 20* 23   BUN 31* 28* 27*   CREATININE 1.1 1.0 1.0   CALCIUM 8.8 8.7 8.8     Recent Labs     02/09/21  0525 02/10/21  0527 02/11/21  0556   AST 45* 34 20   * 101* 81*   BILITOT 0.4 0.3 0.5   ALKPHOS 334* 305* 268*     No results for input(s): INR in the last 72 hours. No results for input(s): Omaira Foster in the last 72 hours. Urinalysis:      Lab Results   Component Value Date    NITRU Negative 02/04/2021    WBCUA 1 02/04/2021    RBCUA 3 02/04/2021    BLOODU Negative 02/04/2021    SPECGRAV 1.016 02/04/2021    GLUCOSEU Negative 02/04/2021       Radiology:  XR CHEST PORTABLE   Final Result   Scattered pulmonary opacities consistent with pneumonia.          CT CHEST PULMONARY EMBOLISM W CONTRAST   Final Result   Small pulmonary embolus left upper lobe and right lower lobe. Bilateral airspace disease denoted by patchy ground glass opacity and   interlobular septal thickening, likely due to the related history of COVID-19   infection. Fatty liver. RECOMMENDATIONS:   Results discussed with Dr. Ronnie Hogan. Liz Castellon MD at 7:17 am on   2/5/2021         XR CHEST PORTABLE   Final Result   Bilateral airspace opacities could represent pulmonary edema, multifocal   bacterial pneumonia or atypical pneumonia                 Assessment/Plan:    Active Hospital Problems    Diagnosis Date Noted    Hypoxia [R09.02]     Sinus bradycardia [R00.1]     Acute respiratory failure with hypoxia (HCC) [J96.01]     Pneumonia due to COVID-19 virus [U07.1, J12.82]     Multifocal pneumonia [J18.9]     Multiple subsegmental pulmonary emboli without acute cor pulmonale (HCC) [I26.94]     Sepsis due to severe acute respiratory syndrome coronavirus 2 (SARS-CoV-2) (HCC) [U07.1, A41.89] 02/04/2021    Type 2 diabetes mellitus ([facility] [E11.9] 03/18/2019         Patient is a 73-year-old male with past medical history of hypertension diabetes who presents to the hospital for shortness of breath    Assessment\        Sepsis due to COVID-19 pneumonia  Acute hypoxic respiratory failure secondary to COVID-19 pneumonia  S/p remdesivir. Decadron 20IV - to 6 PO daily starting 2/10  Stop zithro and rocephin. O2 from NRB to 4l/min to RA       Bilateral small PE  Lovenox 80BID - to eliquis on 2/9/2021. Hypertension       Diabetes mellitus 2 with steroid hyperglycemia. Increase lantus. Humulin R IV repeat today   Cont prandial bolus. IVF bolus today. Bradycardia - sinus bradycardia HR as low as 35 overnight - appears asymptomatic. TSH normal.   EKG sinus bradycardia. Get ECHO. Ask cardiology to eval.         DVT Prophylaxis: Lovenox 1 mg/kg twice daily to eliquis.    Diet: DIET CARB CONTROL; Carb Control: 4 carb choices (60 gms)/meal  Code Status: Full Code    PT/OT Eval Status: ambulatory. Dispo - cc.       Italia Fregoso MD  Hospitalist

## 2021-02-11 NOTE — CARE COORDINATION
[facility]  Diabetes Education   Progress Note       NAME:  Abdirizak RECORD NUMBER:  [id number]  AGE: 62 y.o. GENDER: male  : 1960  TODAY'S DATE:  2021    Subjective   Reason for Diabetic Education Evaluation and Assessment: hyperglycemia    Education session with Welsh Status interpretor via phone conversation. Elena Arellano RN provided demonstration of the insulin. Ila Sheriff reports that diabetes is a new diagnosis. He has a preference for oral medication over insulin if possible. He states that he has given insulin to his mother. Visit Type: evaluation      Milagros Smith is a 62 y.o. male referred by:     [x] Physician  [] Nursing  [] Chart Review   [] Other:     PAST MEDICAL HISTORY        Diagnosis Date    Gout     Hypertension 2018    Prediabetes 2018       PAST SURGICAL HISTORY    Past Surgical History:   Procedure Laterality Date    SHOULDER SURGERY         FAMILY HISTORY    History reviewed. No pertinent family history.     SOCIAL HISTORY    Social History     Tobacco Use    Smoking status: Never Smoker    Smokeless tobacco: Never Used   Substance Use Topics    Alcohol use: No    Drug use: No       ALLERGIES    No Known Allergies    MEDICATIONS     insulin lispro  5 Units Subcutaneous TID WC    insulin glargine  10 Units Subcutaneous BID    apixaban  10 mg Oral BID    dexamethasone  6 mg Oral Daily    insulin lispro  0-6 Units Subcutaneous TID WC    insulin lispro  0-3 Units Subcutaneous Nightly    Vitamin D  2,000 Units Oral Daily    sodium chloride flush  10 mL Intravenous 2 times per day       Objective        Patient Active Problem List   Diagnosis Code    Essential hypertension I10    Arthralgia M25.50    Acute prostatitis N41.0    Hx of scrotal mass Z87.438    Prediabetes R73.03    Other hyperlipidemia E78.49    Gout, arthritis M10.9    Type 2 diabetes mellitus ([facility] E11.9    Sepsis due to severe acute respiratory syndrome coronavirus 2 (SARS-CoV-2) ([facility]) U07.1, A41.89    Acute respiratory failure with hypoxia ([facility]) J96.01    Pneumonia due to COVID-19 virus U07.1, J12.82    Multifocal pneumonia J18.9    Multiple subsegmental pulmonary emboli without acute cor pulmonale ([facility]) I26.94    Hypoxia R09.02    Sinus bradycardia R00.1        BP (!) 100/59   Pulse 57   Temp 98.2 °F (36.8 °C) (Oral)   Resp 16   Ht 5' 7\" (1.702 m)   Wt 165 lb 12.6 oz (75.2 kg)   SpO2 97%   BMI 25.97 kg/m²     HgBA1c:    Lab Results   Component Value Date    LABA1C 7.0 02/05/2021       Recent Labs     02/10/21  2014 02/10/21  2321 02/11/21  0806 02/11/21  1103   POCGLU 298* 212* 158* 258*       BUN/Creatinine:    Lab Results   Component Value Date    BUN 27 02/11/2021    CREATININE 1.0 02/11/2021       Assessment        Diabetes Management and Education    Does the patient have a Primary Care Physician? No     Does the patient require new medication instruction? Yes - requiring insulin with steroids. Discussed impact of steroids on blood sugars. Introduced possible insulin at discharge. Introduced vial/syirnge and insulin pen options. Person responsible for administration of Insulin/Medication:        [x] Self     [] Caregiver       [] Spouse       [] Other Family Member   []  Other    Insulin Instruction:  vial and syringe and insulin pen  Injection Site:   [x] location    [x] rotation     Level of patient/caregiver understanding able to:       [] Verbalized Understanding   [] Demonstrated Understanding       [] Teach Back       [x] Needs Reinforcement     []  Other:        Does the patient/caregiver monitor Blood Glucoses? No:     Does the patient/caregiver follow a Meal Plan? No: Likes to drink apple juice. 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 Recommend making water, unsweetened tea or coffee primary drinks.          Level of patient/caregiver understanding able to:       [x] Verbalized Understanding   [] Demonstrated Understanding       [] Teach Back       [x] Needs Reinforcement     []  Other:      Does the patient/caregiver understand S/S of Hypoglycemia? No:                     Does the patient/caregiver understand S/S of Hyperglycemia? No:     Reviewed symptoms, prevention and treatment. Level of patient/caregiver understanding able to:        [] Verbalized Understanding   [] Demonstrated Understanding       [] Teach Back       [x] Needs Reinforcement     []  Other:           Plan        Ongoing diabetes education and blood glucose monitoring. Prefers oral medication over insulin. Prefers insulin pens over vial/syringe.                                              Discharge Plan:  Discharge needs: Glucometer/strips/lancets        Teaching Time Diabetes Education:  15 minutes     Electronically signed by Nolan Camacho on 2/11/2021 at 3:02 PM

## 2021-02-11 NOTE — PROGRESS NOTES
Nutrition Assessment     Type and Reason for Visit: Initial    Nutrition Recommendations/Plan:   No nutrition concerns at this time    Nutrition Assessment:  LOS assessment. Pt's nutrition status seems adequate. COVID+ on decadron. Glucose 158. Insulin on board. Diabetes education provided by nurse 2/10. On carb control diet with % intake. No significant weight loss per EMR. No nutrition concerns at this time. Will continue to monitor. Malnutrition Assessment:  Malnutrition Status: No malnutrition    Nutrition Related Findings: +5.5 liters. Active BS. No edema.       Current Nutrition Therapies:    DIET CARB CONTROL; Carb Control: 4 carb choices (60 gms)/meal    Anthropometric Measures:  · Height: 5' 7\" (170.2 cm)  · Current Body Wt: 165 lb (74.8 kg)   · BMI: 25.8    Nutrition Diagnosis:   No nutrition diagnosis at this time    Nutrition Interventions:   Food and/or Nutrient Delivery:  Continue Current Diet  Nutrition Education/Counseling:  Education not indicated   Coordination of Nutrition Care:  Continue to monitor while inpatient    Goals:  Continued PO intake greater than 50%       Nutrition Monitoring and Evaluation:   Behavioral-Environmental Outcomes:  None Identified   Food/Nutrient Intake Outcomes:  Food and Nutrient Intake  Physical Signs/Symptoms Outcomes:  Biochemical Data, Weight     Discharge Planning:    No discharge needs at this time     Electronically signed by Aly Ricardo RD, LD on 2/11/21 at 9:05 AM EST    Contact: [phone]

## 2021-02-12 VITALS
HEIGHT: 67 IN | HEART RATE: 62 BPM | WEIGHT: 164.02 LBS | TEMPERATURE: 98.9 F | BODY MASS INDEX: 25.74 KG/M2 | OXYGEN SATURATION: 94 % | SYSTOLIC BLOOD PRESSURE: 103 MMHG | RESPIRATION RATE: 18 BRPM | DIASTOLIC BLOOD PRESSURE: 55 MMHG

## 2021-02-12 LAB
A/G RATIO: 0.9 (ref 1.1–2.2)
ALBUMIN SERPL-MCNC: 3.3 G/DL (ref 3.4–5)
ALP BLD-CCNC: 264 U/L (ref 40–129)
ALT SERPL-CCNC: 74 U/L (ref 10–40)
ANION GAP SERPL CALCULATED.3IONS-SCNC: 13 MMOL/L (ref 3–16)
AST SERPL-CCNC: 25 U/L (ref 15–37)
BASOPHILS ABSOLUTE: 0.1 K/UL (ref 0–0.2)
BASOPHILS RELATIVE PERCENT: 0.7 %
BILIRUB SERPL-MCNC: 0.5 MG/DL (ref 0–1)
BUN BLDV-MCNC: 23 MG/DL (ref 7–20)
CALCIUM SERPL-MCNC: 8.9 MG/DL (ref 8.3–10.6)
CHLORIDE BLD-SCNC: 98 MMOL/L (ref 99–110)
CO2: 23 MMOL/L (ref 21–32)
CREAT SERPL-MCNC: 1 MG/DL (ref 0.8–1.3)
EOSINOPHILS ABSOLUTE: 0 K/UL (ref 0–0.6)
EOSINOPHILS RELATIVE PERCENT: 0.1 %
GFR AFRICAN AMERICAN: >60
GFR NON-AFRICAN AMERICAN: >60
GLOBULIN: 3.5 G/DL
GLUCOSE BLD-MCNC: 126 MG/DL (ref 70–99)
GLUCOSE BLD-MCNC: 152 MG/DL (ref 70–99)
GLUCOSE BLD-MCNC: 168 MG/DL (ref 70–99)
HCT VFR BLD CALC: 42.9 % (ref 40.5–52.5)
HEMOGLOBIN: 14.7 G/DL (ref 13.5–17.5)
LYMPHOCYTES ABSOLUTE: 2 K/UL (ref 1–5.1)
LYMPHOCYTES RELATIVE PERCENT: 15 %
MCH RBC QN AUTO: 31.5 PG (ref 26–34)
MCHC RBC AUTO-ENTMCNC: 34.2 G/DL (ref 31–36)
MCV RBC AUTO: 91.9 FL (ref 80–100)
MONOCYTES ABSOLUTE: 1.3 K/UL (ref 0–1.3)
MONOCYTES RELATIVE PERCENT: 9.6 %
NEUTROPHILS ABSOLUTE: 9.8 K/UL (ref 1.7–7.7)
NEUTROPHILS RELATIVE PERCENT: 74.6 %
PDW BLD-RTO: 13.9 % (ref 12.4–15.4)
PERFORMED ON: ABNORMAL
PERFORMED ON: ABNORMAL
PLATELET # BLD: 239 K/UL (ref 135–450)
PMV BLD AUTO: 9.2 FL (ref 5–10.5)
POTASSIUM REFLEX MAGNESIUM: 4.4 MMOL/L (ref 3.5–5.1)
PROCALCITONIN: 0.07 NG/ML (ref 0–0.15)
RBC # BLD: 4.67 M/UL (ref 4.2–5.9)
SODIUM BLD-SCNC: 134 MMOL/L (ref 136–145)
TOTAL PROTEIN: 6.8 G/DL (ref 6.4–8.2)
WBC # BLD: 13.1 K/UL (ref 4–11)

## 2021-02-12 PROCEDURE — 85025 COMPLETE CBC W/AUTO DIFF WBC: CPT

## 2021-02-12 PROCEDURE — 2580000003 HC RX 258: Performed by: INTERNAL MEDICINE

## 2021-02-12 PROCEDURE — 94760 N-INVAS EAR/PLS OXIMETRY 1: CPT

## 2021-02-12 PROCEDURE — 84145 PROCALCITONIN (PCT): CPT

## 2021-02-12 PROCEDURE — 80053 COMPREHEN METABOLIC PANEL: CPT

## 2021-02-12 PROCEDURE — 6370000000 HC RX 637 (ALT 250 FOR IP): Performed by: INTERNAL MEDICINE

## 2021-02-12 PROCEDURE — 36415 COLL VENOUS BLD VENIPUNCTURE: CPT

## 2021-02-12 RX ORDER — BLOOD-GLUCOSE METER
1 KIT MISCELLANEOUS DAILY
Qty: 1 KIT | Refills: 0 | Status: SHIPPED | OUTPATIENT
Start: 2021-02-12

## 2021-02-12 RX ORDER — GLUCOSAMINE HCL/CHONDROITIN SU 500-400 MG
CAPSULE ORAL
Qty: 100 STRIP | Refills: 0 | Status: SHIPPED | OUTPATIENT
Start: 2021-02-12

## 2021-02-12 RX ORDER — LISINOPRIL 10 MG/1
10 TABLET ORAL DAILY
Qty: 30 TABLET | Refills: 1 | Status: SHIPPED | OUTPATIENT
Start: 2021-02-12

## 2021-02-12 RX ORDER — DEXAMETHASONE 6 MG/1
6 TABLET ORAL DAILY
Qty: 2 TABLET | Refills: 0 | Status: SHIPPED | OUTPATIENT
Start: 2021-02-13 | End: 2021-02-15

## 2021-02-12 RX ORDER — LANCETS 30 GAUGE
1 EACH MISCELLANEOUS 2 TIMES DAILY
Qty: 300 EACH | Refills: 1 | Status: SHIPPED | OUTPATIENT
Start: 2021-02-12

## 2021-02-12 RX ORDER — GLIPIZIDE 5 MG/1
10 TABLET ORAL
Status: DISCONTINUED | OUTPATIENT
Start: 2021-02-12 | End: 2021-02-12 | Stop reason: HOSPADM

## 2021-02-12 RX ORDER — CHOLECALCIFEROL (VITAMIN D3) 50 MCG
2000 TABLET ORAL DAILY
Qty: 60 TABLET | Refills: 0 | Status: SHIPPED | OUTPATIENT
Start: 2021-02-13

## 2021-02-12 RX ORDER — GLIPIZIDE 10 MG/1
10 TABLET ORAL
Qty: 60 TABLET | Refills: 3 | Status: SHIPPED | OUTPATIENT
Start: 2021-02-12

## 2021-02-12 RX ADMIN — INSULIN LISPRO 1 UNITS: 100 INJECTION, SOLUTION INTRAVENOUS; SUBCUTANEOUS at 12:26

## 2021-02-12 RX ADMIN — METFORMIN HYDROCHLORIDE 500 MG: 500 TABLET ORAL at 08:57

## 2021-02-12 RX ADMIN — INSULIN LISPRO 5 UNITS: 100 INJECTION, SOLUTION INTRAVENOUS; SUBCUTANEOUS at 12:26

## 2021-02-12 RX ADMIN — SODIUM CHLORIDE, PRESERVATIVE FREE 10 ML: 5 INJECTION INTRAVENOUS at 08:58

## 2021-02-12 RX ADMIN — GLIPIZIDE 10 MG: 5 TABLET ORAL at 08:57

## 2021-02-12 RX ADMIN — DEXAMETHASONE 6 MG: 6 TABLET ORAL at 08:57

## 2021-02-12 RX ADMIN — APIXABAN 10 MG: 5 TABLET, FILM COATED ORAL at 08:57

## 2021-02-12 RX ADMIN — INSULIN LISPRO 5 UNITS: 100 INJECTION, SOLUTION INTRAVENOUS; SUBCUTANEOUS at 08:58

## 2021-02-12 RX ADMIN — Medication 2000 UNITS: at 08:57

## 2021-02-12 NOTE — PLAN OF CARE
Problem: Airway Clearance - Ineffective  Goal: Achieve or maintain patent airway  2/12/2021 1620 by Edwin Naidu RN  Outcome: Ongoing  2/12/2021 0813 by Raymundo Chaudhry RN  Outcome: Ongoing     Problem: Gas Exchange - Impaired  Goal: Absence of hypoxia  2/12/2021 1620 by Edwin Naidu RN  Outcome: Ongoing  2/12/2021 0813 by Raymundo Chaudhry RN  Outcome: Ongoing  Goal: Promote optimal lung function  2/12/2021 1620 by Edwin Naidu RN  Outcome: Ongoing  2/12/2021 0813 by Raymundo Chaudhry RN  Outcome: Ongoing     Problem: Breathing Pattern - Ineffective  Goal: Ability to achieve and maintain a regular respiratory rate  2/12/2021 1620 by Edwin Naidu RN  Outcome: Ongoing  2/12/2021 0813 by Raymundo Chaudhry RN  Outcome: Ongoing     Problem:  Body Temperature -  Risk of, Imbalanced  Goal: Ability to maintain a body temperature within defined limits  2/12/2021 1620 by Edwin Naidu RN  Outcome: Ongoing  2/12/2021 0813 by Raymundo Chaudhry RN  Outcome: Ongoing  Goal: Will regain or maintain usual level of consciousness  2/12/2021 1620 by Edwin Naidu RN  Outcome: Ongoing  2/12/2021 0813 by Raymundo Chaudhry RN  Outcome: Ongoing  Goal: Complications related to the disease process, condition or treatment will be avoided or minimized  2/12/2021 1620 by Edwin Naidu RN  Outcome: Ongoing  2/12/2021 0813 by Raymundo Chaudhry RN  Outcome: Ongoing     Problem: Isolation Precautions - Risk of Spread of Infection  Goal: Prevent transmission of infection  2/12/2021 1620 by Edwin Naidu RN  Outcome: Ongoing  2/12/2021 0813 by Raymundo Chaudhry RN  Outcome: Ongoing     Problem: Nutrition Deficits  Goal: Optimize nutritional status  2/12/2021 1620 by Edwin Naidu RN  Outcome: Ongoing  2/12/2021 0813 by Raymundo Chaudhry RN  Outcome: Ongoing     Problem: Risk for Fluid Volume Deficit  Goal: Maintain normal heart rhythm  2/12/2021 1620 by Edwin Naidu RN  Outcome: Ongoing  2/12/2021 0813 by Azeb Francisco RN  Outcome: Ongoing  Goal: Maintain absence of muscle cramping  2/12/2021 1620 by Lisa Vargas RN  Outcome: Ongoing  2/12/2021 0813 by Azeb Francisco RN  Outcome: Ongoing  Goal: Maintain normal serum potassium, sodium, calcium, phosphorus, and pH  2/12/2021 1620 by Lisa Vargas RN  Outcome: Ongoing  2/12/2021 0813 by Azeb Francisco RN  Outcome: Ongoing     Problem: Loneliness or Risk for Loneliness  Goal: Demonstrate positive use of time alone when socialization is not possible  2/12/2021 1620 by Lisa Vargas RN  Outcome: Ongoing  2/12/2021 0813 by Azeb Francisco RN  Outcome: Ongoing     Problem: Fatigue  Goal: Verbalize increase energy and improved vitality  2/12/2021 1620 by Lisa Vargas RN  Outcome: Ongoing  2/12/2021 0813 by Azeb Francisco RN  Outcome: Ongoing     Problem: Patient Education: Go to Patient Education Activity  Goal: Patient/Family Education  2/12/2021 1620 by Lisa Vargas RN  Outcome: Ongoing  2/12/2021 0813 by Azeb Francisco RN  Outcome: Ongoing     Problem: Skin Integrity:  Goal: Will show no infection signs and symptoms  Description: Will show no infection signs and symptoms  2/12/2021 1620 by Lisa Vargas RN  Outcome: Ongoing  2/12/2021 0813 by Azeb Francisco RN  Outcome: Ongoing  Goal: Absence of new skin breakdown  Description: Absence of new skin breakdown  2/12/2021 1620 by Lisa Vargas RN  Outcome: Ongoing  2/12/2021 0813 by Azeb Francisco RN  Outcome: Ongoing     Problem: Falls - Risk of:  Goal: Will remain free from falls  Description: Will remain free from falls  2/12/2021 1620 by Lisa Vargas RN  Outcome: Ongoing  2/12/2021 0813 by Azeb Francisco RN  Outcome: Ongoing  Goal: Absence of physical injury  Description: Absence of physical injury  2/12/2021 1620 by Lisa Vargas RN  Outcome: Ongoing  2/12/2021 0813 by Azeb Francisco RN  Outcome: Ongoing     Problem: Pain:  Goal: Pain level will decrease  Description: Pain level will decrease  2/12/2021 1620 by Lisa Vargas RN  Outcome: Ongoing  2/12/2021 0813 by Azeb Francisco RN  Outcome: Ongoing  Goal: Control of acute pain  Description: Control of acute pain  2/12/2021 1620 by Lisa Vargas RN  Outcome: Ongoing  2/12/2021 0813 by Azeb Francisco RN  Outcome: Ongoing  Goal: Control of chronic pain  Description: Control of chronic pain  2/12/2021 1620 by Lisa Vargas RN  Outcome: Ongoing  2/12/2021 0813 by Azeb Francisco RN  Outcome: Ongoing

## 2021-02-12 NOTE — PROGRESS NOTES
Clinical Pharmacy Note  Medication Counseling    Reviewed new medications started during hospital admission: Apixaban. Indications and side effects were emphasized during counseling. All medication-related questions addressed. Patient verbalized understanding of education via Swedish interpretor. Should the patient express any additional questions or concerns regarding their medications, please do not hesitate to contact the pharmacy department. Patient/caregiver aware they may refuse medications during hospital stay. 10 minutes spent educating patient regarding medications.   Martell Spurling, RPh 2/12/2021 12:48 PM

## 2021-02-12 NOTE — PROGRESS NOTES
Pt discharged home at 1418. Discharge instructions given and explained. All questions explained. IV is removed. Dressing applied to site. Transportation to home provided by son.     Electronically signed by Renny Dailey RN on 2/12/2021 at 4:20 PM

## 2021-02-12 NOTE — CARE COORDINATION
Discharge noted. Spoke to patient over the phone via  services ID# [id number]. Patient aware of discharge--thankful to be discharging home today. Patient verifies he has an appt with Dr Trang Aquino on the 17th. This is his first visit with this PCP. I explained I couldn't find this PCP in system, asked for MD phone # but he said he doesn't have number with him. He says he will follow up with this doctor denies me needing to set up any appointments for him. Explained his new medications Eliquis and diabetic supplies have been sent to our retail pharmacy, per NAIN Beck they will bring these to the room. Logan Clay pharmacist to discuss new med Eliquis. No additional needs to note. Patient tells me his son will transport him home. Denies needing assistance in home.  Electronically signed by Amanda Paez RN Case Management [phone] on 2/12/2021 at 12:31 PM

## 2021-02-12 NOTE — DISCHARGE INSTR - COC
Continuity of Care Form    Patient Name: Isaac Notice   :  1960  MRN:  [id number]    Admit date:  2021  Discharge date:  ***    Code Status Order: Full Code   Advance Directives:   Advance Care Flowsheet Documentation     Date/Time Healthcare Directive Type of Healthcare Directive Copy in [address] Agent's Name Healthcare Agent's Phone Number    21 1846  No, patient does not have an advance directive for healthcare treatment -- -- -- -- --          Admitting Physician:  Caroline Thompson MD  PCP: No primary care provider on file. Discharging Nurse: [facility] Unit/Room#: J4S-9216/5252-01  Discharging Unit Phone Number: ***    Emergency Contact:   Extended Emergency Contact Information  Primary Emergency Contact: Kaelyn Zamora  Home Phone: [phone]  Relation: Child    Past Surgical History:  Past Surgical History:   Procedure Laterality Date    SHOULDER SURGERY         Immunization History: There is no immunization history on file for this patient.     Active Problems:  Patient Active Problem List   Diagnosis Code    Essential hypertension I10    Arthralgia M25.50    Acute prostatitis N41.0    Hx of scrotal mass Z87.438    Prediabetes R73.03    Other hyperlipidemia E78.49    Gout, arthritis M10.9    Type 2 diabetes mellitus ([facility] [address] E11.9    Sepsis due to severe acute respiratory syndrome coronavirus 2 (SARS-CoV-2) ([facility]) U07.1, A41.89    Acute respiratory failure with hypoxia ([facility]) J96.01    Pneumonia due to COVID-19 virus U07.1, J12.82    Multifocal pneumonia J18.9    Multiple subsegmental pulmonary emboli without acute cor pulmonale ([facility]) I26.94    Hypoxia R09.02    Sinus bradycardia R00.1       Isolation/Infection:   Isolation          Droplet Plus        Patient Infection Status     Infection Onset Added Last Indicated Last Indicated By Review Planned Expiration Resolved Resolved By    COVID-19 21 21 COVID-19 21      Resolved    COVID-19 Rule Out 21 COVID-19 (Ordered)   21 Rule-Out Test Resulted          Nurse Assessment:  Last Vital Signs: BP (!) 103/55   Pulse 62   Temp 98.9 °F (37.2 °C) (Oral)   Resp 18   Ht 5' 7\" (1.702 m)   Wt 164 lb 0.4 oz (74.4 kg)   SpO2 94%   BMI 25.69 kg/m²     Last documented pain score (0-10 scale): Pain Level: 0  Last Weight:   Wt Readings from Last 1 Encounters:   21 164 lb 0.4 oz (74.4 kg)     Mental Status:  {IP PT MENTAL STATUS:}    IV Access:  { TYRON IV ACCESS:455170881}    Nursing Mobility/ADLs:  Walking   {CHP DME HTV}  Transfer  {P DME YMAK:844761915}  Bathing  {P DME YTBS:181731497}  Dressing  {CHP DME FNJY:356658791}  Toileting  {CHP DME CFI}  Feeding  {P DME INDD:783596109}  Med Admin  {[facility] DME RUTN:425898724}  Med Delivery   { TYRON MED Delivery:155193912}    Wound Care Documentation and Therapy:        Elimination:  Continence:   · Bowel: {YES / RU:20423}  · Bladder: {YES / IQ:91996}  Urinary Catheter: {Urinary Catheter:381271080}   Colostomy/Ileostomy/Ileal Conduit: {YES / UT:95489}       Date of Last BM: ***    Intake/Output Summary (Last 24 hours) at 2021 1204  Last data filed at 2021 0953  Gross per 24 hour   Intake 1090 ml   Output 200 ml   Net 890 ml     I/O last 3 completed shifts:   In: 56 [P.O.:1020]  Out: -     Safety Concerns:     [email or web address] Safety Concerns:420535585}    Impairments/Disabilities:      [email or web address] Impairments/Disabilities:348686925}    Nutrition Therapy:  Current Nutrition Therapy:   [address] [email or web address] Diet List:894178860}    Routes of Feeding: {CHP DME Other Feedings:445048708}  Liquids: {Slp liquid thickness:56903}  Daily Fluid Restriction: {CHP DME Yes amt example:115630864}  Last Modified Barium Swallow with Video (Video Swallowing Test): {Done Not Done RUB:802504244}    Treatments at the Time of Hospital Discharge:   Respiratory Treatments:

## 2021-02-12 NOTE — DISCHARGE SUMMARY
Trying to get him set up with PCP. Diabetes educator assistance much appreciated. Will give scripts for meter lancets and test strips as well. I think we should be able ot avoid insulin on discharge.         Bradycardia - sinus bradycardia HR as low as 35 overnight - appears asymptomatic. TSH normal.   EKG sinus bradycardia. Get ECHO - reassuring - reserved EF without signs of valvular disease. .   Ask cardiology to eval - expectant management - no indication for pacemaker at this time. Physical Exam Performed:     BP (!) 103/55   Pulse 62   Temp 98.9 °F (37.2 °C) (Oral)   Resp 18   Ht 5' 7\" (1.702 m)   Wt 164 lb 0.4 oz (74.4 kg)   SpO2 94%   BMI 25.69 kg/m²       General appearance: Ill-appearing, lying in bed comfortably  HEENT:  Normal cephalic, atraumatic without obvious deformity. Pupils equal, round, and reactive to light.  Extra ocular muscles intact. Conjunctivae/corneas clear. Neck: Supple, with full range of motion. No jugular venous distention. Trachea midline. Respiratory:  Mildly decreased breathing sounds bilaterally  Cardiovascular:  Regular rate and rhythm with normal S1/S2 without murmurs, rubs or gallops. Abdomen: Soft, non-tender, non-distended with normal bowel sounds. Musculoskeletal:  No clubbing, cyanosis or edema bilaterally.  Full range of motion without deformity. Skin: Skin color, texture, turgor normal.  No rashes or lesions. Neurologic:  Neurovascularly intact without any focal sensory/motor deficits. Cranial nerves: II-XII intact, grossly non-focal.  Psychiatric:  Alert and oriented, thought content appropriate, normal insight  Capillary Refill: Brisk,< 3 seconds   Peripheral Pulses: +2 palpable, equal bilaterally          Labs:  For convenience and continuity at follow-up the following most recent labs are provided:      CBC:    Lab Results   Component Value Date    WBC 13.1 02/12/2021    HGB 14.7 02/12/2021    HCT 42.9 02/12/2021     02/12/2021 Renal:    Lab Results   Component Value Date     02/12/2021    K 4.4 02/12/2021    CL 98 02/12/2021    CO2 23 02/12/2021    BUN 23 02/12/2021    CREATININE 1.0 02/12/2021    CALCIUM 8.9 02/12/2021         Significant Diagnostic Studies    Radiology:   XR CHEST PORTABLE   Final Result   Scattered pulmonary opacities consistent with pneumonia. CT CHEST PULMONARY EMBOLISM W CONTRAST   Final Result   Small pulmonary embolus left upper lobe and right lower lobe. Bilateral airspace disease denoted by patchy ground glass opacity and   interlobular septal thickening, likely due to the related history of COVID-19   infection. Fatty liver. RECOMMENDATIONS:   Results discussed with Dr. Laina Buck. Justin Carnes MD at 7:17 am on   2/5/2021         XR CHEST PORTABLE   Final Result   Bilateral airspace opacities could represent pulmonary edema, multifocal   bacterial pneumonia or atypical pneumonia                Consults:     IP CONSULT TO PULMONOLOGY  IP CONSULT TO CARDIOLOGY    Disposition:  home     Condition at Discharge: Stable    Discharge Instructions/Follow-up:  PCP 1 week     Code Status:  Full Code     Activity: activity as tolerated    Diet: diabetic diet      Discharge Medications:     Current Discharge Medication List           Details   apixaban starter pack (ELIQUIS DVT/PE STARTER PACK) 5 MG TBPK tablet Take 1 tablet by mouth See Admin Instructions Pt has already completed first 4 days of the loading dose in the hospital.  Qty: 60 each, Refills: 5      glipiZIDE (GLUCOTROL) 10 MG tablet Take 1 tablet by mouth 2 times daily (before meals)  Qty: 60 tablet, Refills: 3      dexamethasone (DECADRON) 6 MG tablet Take 1 tablet by mouth daily for 2 days  Qty: 2 tablet, Refills: 0      Vitamin D (CHOLECALCIFEROL) 50 MCG (2000 UT) TABS tablet Take 1 tablet by mouth daily  Qty: 60 tablet, Refills: 0    Comments: Labeling may look different. 25 mcg=1000 Units. Please double check dosages. Multiple Vitamin (MULTI VITAMIN) TABS Take 1 each by mouth daily  Qty: 30 tablet, Refills: 2      glucose monitoring kit (FREESTYLE) monitoring kit 1 kit by Does not apply route daily  Qty: 1 kit, Refills: 0      Lancets MISC 1 each by Does not apply route 2 times daily  Qty: 300 each, Refills: 1      blood glucose monitor strips Test 2 times a day & as needed for symptoms of irregular blood glucose. Dispense sufficient amount for indicated testing frequency plus additional to accommodate PRN testing needs. Qty: 100 strip, Refills: 0    Comments: Brand per patient preference. May round up to next available package size. Details   metFORMIN (GLUCOPHAGE) 500 MG tablet Take 1 tablet by mouth 2 times daily (with meals)  Qty: 60 tablet, Refills: 2    Associated Diagnoses: Prediabetes      lisinopril (PRINIVIL;ZESTRIL) 10 MG tablet Take 1 tablet by mouth daily  Qty: 30 tablet, Refills: 1    Comments: Nepali labels please  Associated Diagnoses: Essential hypertension              Details   atorvastatin (LIPITOR) 20 MG tablet Take 1 tablet by mouth daily  Qty: 30 tablet, Refills: 2    Associated Diagnoses: Other hyperlipidemia      Omega-3 Fatty Acids (OMEGA-3 FISH OIL) 1000 MG CAPS Take 1 capsule by mouth daily  Qty: 90 capsule, Refills: 2    Comments: Nepali label please  Associated Diagnoses: Other hyperlipidemia      allopurinol (ZYLOPRIM) 100 MG tablet Take 2 tablets by mouth daily  Qty: 30 tablet, Refills: 3    Comments: Nepali label please  Associated Diagnoses: Essential hypertension             Time Spent on discharge is more than 30 minutes in the examination, evaluation, counseling and review of medications and discharge plan. Signed:    Joana Mcmanus MD   2/12/2021      Thank you No primary care provider on file. for the opportunity to be involved in this patient's care. If you have any questions or concerns please feel free to contact me at [phone].

## 2021-02-12 NOTE — PLAN OF CARE
Problem: Airway Clearance - Ineffective  Goal: Achieve or maintain patent airway  2/12/2021 1620 by Johanny Francisco RN  Outcome: Completed  2/12/2021 1620 by Johanny Francisco RN  Outcome: Ongoing  2/12/2021 0813 by Ludwin Navarro RN  Outcome: Ongoing     Problem: Gas Exchange - Impaired  Goal: Absence of hypoxia  2/12/2021 1620 by Johanny Francisco RN  Outcome: Completed  2/12/2021 1620 by Johanny Francisco RN  Outcome: Ongoing  2/12/2021 0813 by Ludwin Navarro RN  Outcome: Ongoing  Goal: Promote optimal lung function  2/12/2021 1620 by Johanny Francisco RN  Outcome: Completed  2/12/2021 1620 by Johanny Francisco RN  Outcome: Ongoing  2/12/2021 0813 by Ludwin Navarro RN  Outcome: Ongoing     Problem: Breathing Pattern - Ineffective  Goal: Ability to achieve and maintain a regular respiratory rate  2/12/2021 1620 by Johanny Francisco RN  Outcome: Completed  2/12/2021 1620 by Johanny Francisco RN  Outcome: Ongoing  2/12/2021 0813 by Ludwin Navarro RN  Outcome: Ongoing     Problem:  Body Temperature -  Risk of, Imbalanced  Goal: Ability to maintain a body temperature within defined limits  2/12/2021 1620 by Johanny Francisco RN  Outcome: Completed  2/12/2021 1620 by Johanny Francisco RN  Outcome: Ongoing  2/12/2021 0813 by Ludwin Navarro RN  Outcome: Ongoing  Goal: Will regain or maintain usual level of consciousness  2/12/2021 1620 by Johanny Francisco RN  Outcome: Completed  2/12/2021 1620 by Johanny Francisco RN  Outcome: Ongoing  2/12/2021 0813 by Ludwin Navarro RN  Outcome: Ongoing  Goal: Complications related to the disease process, condition or treatment will be avoided or minimized  2/12/2021 1620 by Johanny Francisco RN  Outcome: Completed  2/12/2021 1620 by Johanny Francisco RN  Outcome: Ongoing  2/12/2021 0813 by Ludwin Navarro RN  Outcome: Ongoing     Problem: Isolation Precautions - Risk of Spread of Infection  Goal: Prevent transmission of infection  2/12/2021 1620 by Johanny Francisco RN  Outcome: Completed  2/12/2021 1620 by Kareen Darling RN  Outcome: Ongoing  2/12/2021 0813 by Margot Jacobson RN  Outcome: Ongoing     Problem: Nutrition Deficits  Goal: Optimize nutritional status  2/12/2021 1620 by Kareen Darling RN  Outcome: Completed  2/12/2021 1620 by Kareen Darling RN  Outcome: Ongoing  2/12/2021 0813 by Margot Jacobson RN  Outcome: Ongoing     Problem: Risk for Fluid Volume Deficit  Goal: Maintain normal heart rhythm  2/12/2021 1620 by Kareen Darling RN  Outcome: Completed  2/12/2021 1620 by Kareen Darling RN  Outcome: Ongoing  2/12/2021 0813 by Margot Jacobson RN  Outcome: Ongoing  Goal: Maintain absence of muscle cramping  2/12/2021 1620 by Kareen Darling RN  Outcome: Completed  2/12/2021 1620 by Kareen Darling RN  Outcome: Ongoing  2/12/2021 0813 by Margot Jacobson RN  Outcome: Ongoing  Goal: Maintain normal serum potassium, sodium, calcium, phosphorus, and pH  2/12/2021 1620 by Kareen Darling RN  Outcome: Completed  2/12/2021 1620 by Kareen Darling RN  Outcome: Ongoing  2/12/2021 0813 by Margot Jacobson RN  Outcome: Ongoing     Problem: Loneliness or Risk for Loneliness  Goal: Demonstrate positive use of time alone when socialization is not possible  2/12/2021 1620 by Kareen Darling RN  Outcome: Completed  2/12/2021 1620 by Kareen Darling RN  Outcome: Ongoing  2/12/2021 0813 by Margot Jacobson RN  Outcome: Ongoing     Problem: Fatigue  Goal: Verbalize increase energy and improved vitality  2/12/2021 1620 by Kareen Darling RN  Outcome: Completed  2/12/2021 1620 by Kareen Darling RN  Outcome: Ongoing  2/12/2021 0813 by Margot Jacobson RN  Outcome: Ongoing     Problem: Patient Education: Go to Patient Education Activity  Goal: Patient/Family Education  2/12/2021 1620 by Kareen Darling RN  Outcome: Completed  2/12/2021 1620 by Kareen Darling RN  Outcome: Ongoing  2/12/2021 0813 by Margot Jacobson RN  Outcome: Ongoing     Problem: Skin Integrity:  Goal: Will show no infection signs and symptoms  Description: Will show no infection signs and symptoms  2/12/2021 1620 by Todd Pena RN  Outcome: Completed  2/12/2021 1620 by Todd Pena RN  Outcome: Ongoing  2/12/2021 0813 by Deng Holt RN  Outcome: Ongoing  Goal: Absence of new skin breakdown  Description: Absence of new skin breakdown  2/12/2021 1620 by Todd Pena RN  Outcome: Completed  2/12/2021 1620 by Todd Pena RN  Outcome: Ongoing  2/12/2021 0813 by Deng Holt RN  Outcome: Ongoing     Problem: Falls - Risk of:  Goal: Will remain free from falls  Description: Will remain free from falls  2/12/2021 1620 by Todd Pena RN  Outcome: Completed  2/12/2021 1620 by Todd Pena RN  Outcome: Ongoing  2/12/2021 0813 by Deng Holt RN  Outcome: Ongoing  Goal: Absence of physical injury  Description: Absence of physical injury  2/12/2021 1620 by Todd Pena RN  Outcome: Completed  2/12/2021 1620 by Todd Pena RN  Outcome: Ongoing  2/12/2021 0813 by Deng Holt RN  Outcome: Ongoing     Problem: Pain:  Goal: Pain level will decrease  Description: Pain level will decrease  2/12/2021 1620 by Todd Pena RN  Outcome: Completed  2/12/2021 1620 by Todd Pena RN  Outcome: Ongoing  2/12/2021 0813 by Deng Holt RN  Outcome: Ongoing  Goal: Control of acute pain  Description: Control of acute pain  2/12/2021 1620 by Todd Pena RN  Outcome: Completed  2/12/2021 1620 by Todd Pena RN  Outcome: Ongoing  2/12/2021 0813 by Deng Holt, RN  Outcome: Ongoing  Goal: Control of chronic pain  Description: Control of chronic pain  2/12/2021 1620 by Todd Pena RN  Outcome: Completed  2/12/2021 1620 by Todd Pena RN  Outcome: Ongoing  2/12/2021 0813 by Deng Holt RN  Outcome: Ongoing

## 2021-02-12 NOTE — PLAN OF CARE

## 2021-02-13 ENCOUNTER — CARE COORDINATION (OUTPATIENT)
Dept: CASE MANAGEMENT | Age: 61
End: 2021-02-13

## 2021-02-13 NOTE — CARE COORDINATION
Date/Time:  2/13/2021 10:44 AM  Attempted to reach patient by telephone using  ([phone]) Call within 2 business days of discharge: Yes Left HIPPA compliant message requesting a return call. Will attempt to reach patient again.

## 2021-02-15 ENCOUNTER — CARE COORDINATION (OUTPATIENT)
Dept: CASE MANAGEMENT | Age: 61
End: 2021-02-15

## 2021-02-15 NOTE — CARE COORDINATION
Challenges to be reviewed by the provider   Additional needs identified to be addressed with provider No  none    Discussed COVID-19 related testing which was available at this time. Test results were positive. Patient informed of results, if available? Yes         Method of communication with provider : none    Advance Care Planning:   Does patient have an Advance Directive:  not on file. Was this a readmission? No    Care Transition Nurse (CTN) contacted the patient by telephone to perform post hospital discharge assessment. Verified name and  with patient as identifiers. Provided introduction to self, and explanation of the CTN role. CTN reviewed discharge instructions, medical action plan and red flags with patient who verbalized understanding. Patient given an opportunity to ask questions and does not have any further questions or concerns at this time. Were discharge instructions available to patient? Yes. Reviewed appropriate site of care based on symptoms and resources available to patient including: PCP. The patient agrees to contact the PCP office for questions related to their healthcare. Medication review of new, changed and stopped medications was performed with patient, who verbalizes understanding of administration of home medications. Advised obtaining a 90-day supply of all daily and as-needed medications. Covid Risk Education    Patient has following risk factors of: diabetes. Education provided regarding infection prevention, and signs and symptoms of COVID-19 and when to seek medical attention with patient who verbalized understanding. Discussed exposure protocols and quarantine From CDC: Are you at higher risk for severe illness?   and given an opportunity for questions and concerns. The patient agrees to contact the COVID-19 hotline [phone] for questions related to COVID-19.        Patient/family/caregiver given information for [facility] and agrees to enroll no  Patient's preferred e-mail: declines  Patient's preferred phone number: declines    Discussed follow-up appointments. If no appointment was previously scheduled, appointment scheduling offered: has an appt. Is follow up appointment scheduled within 7 days of discharge? Yes  Non-[facility] follow up appointment(s):     Plan for follow-up call in 5-7 days based on severity of symptoms and risk factors. CTN provided contact information for future needs. Writer spoke with patient, Leigh Sevilla using [address]  [phone] about 20 minutes into the conversation  changed to #[phone]. Patient stated feels fine, had body aches yesterday but not today. He stated his breathing is \"perfect\",only a little cough and no sign of fever. He stated his BG this morning was 111. When reviewing new medications, patient stated has been taking only 5mg of eliquis BID, instruction stated 10mg BID for 8 more doses. Writer called Asa Score, sound physician nurse and left  requesting a return call. Writer called [facility] and spoke with charge nurse who stated Dr. Shelby Manning was working today and gave writer number to call to page him    Writer called and requested Dr. Shelby Manning call Raymond Monroy. Dr. hSelby Manning returned call and stated no change in eliquis dose, patient to continue taking Eliquis 5mg. BID. Writer called patient back using  [phone] and told patient to continue taking eliquis 5 mg BID, patient verbalized understanding and had no questions.

## 2021-02-18 ENCOUNTER — CARE COORDINATION (OUTPATIENT)
Dept: CASE MANAGEMENT | Age: 61
End: 2021-02-18

## 2021-02-18 NOTE — CARE COORDINATION
Needs to be reviewed by the provider   Additional needs identified to be addressed with provider No    Discussed COVID-19 related testing which was available at this time. Test results were positive. Patient informed of results, if available? Yes         Method of communication with provider : none    Care Transition Nurse (CTN) contacted the patient by telephone to follow up after admission on 2021. Verified name and  with patient as identifiers. Follow up appointment completed? Yes    Advance Care Planning:   Does patient have an Advance Directive:  not on file. CTN reviewed discharge instructions, medical action plan and red flags with patient and discussed any barriers to care and/or understanding of plan of care after discharge. Discussed appropriate site of care based on symptoms and resources available to patient including: PCP. The patient agrees to contact the PCP office for questions related to their healthcare. Patients top risk factors for readmission: medical condition  Interventions to address risk factors: Obtained and reviewed discharge summary and/or continuity of care documents      Plan for follow-up call in 7-10 days based on severity of symptoms and risk factors. Plan for next call: symptom management-covid symptoms  CTN provided contact information for future needs. Patient called writer and left VM. Writer returned call, using  #[phone]. Patient stated he went to PCP yesterday and he wanted writer to know that PCP stopped one of diabetic medicines. Patient stated his BG today was 118, writer told him that was a good number and patient stated PCP told him to go to the hospital if BG was over 300. Writer recommended calling PCP if BG numbers were consistently over 200. Patient verbalized understanding. Patient stated he feels \"very, very good\" and feels he is 80%. Patient stated has no specific S&S of covid at this time.

## 2021-02-25 ENCOUNTER — CARE COORDINATION (OUTPATIENT)
Dept: CASE MANAGEMENT | Age: 61
End: 2021-02-25

## 2021-02-25 NOTE — CARE COORDINATION
Needs to be reviewed by the provider   Additional needs identified to be addressed with provider No  none  Discussed COVID-19 related testing which was available at this time. Test results were positive. Patient informed of results, if available? Yes         Method of communication with provider : none    Care Transition Nurse (CTN) contacted the patient by telephone to follow up after admission on 2021. Verified name and  with patient as identifiers. Follow up appointment completed? Yes    Advance Care Planning:   Does patient have an Advance Directive:  not on file. . Discussed appropriate site of care based on symptoms and resources available to patient including: PCP. The patient agrees to contact the PCP office for questions related to their healthcare. Plan for follow-up call in 5-7 days based on severity of symptoms and risk factors. Plan for next call: medication management-cost of drug  CTN provided contact information for future needs. Writer spoke with patient using  [phone]. Patient stated doing \"very, very good\"  He stated has no body aches, not SOB , no cough and no sign of a fever. He stated saw PCP on . Patient stated PCP started him on medicine that was going to cost $700 at [facility],  Patient stated sent to [address] today. Patient stated has a drug card but does not know what the price will be. Patient stated does not know what the medication is for, but has all of his current medication and is taking it as prescribed. Patient stated has f/u appt with PCP next month. Patient stated his BG today was 128.

## 2021-03-03 ENCOUNTER — CARE COORDINATION (OUTPATIENT)
Dept: CASE MANAGEMENT | Age: 61
End: 2021-03-03

## 2021-03-03 NOTE — CARE COORDINATION
Patient resolved from the Care Transitions episode on  3/3/2021. Patient called using : [phone] related testing which was available at this time. Test results were positive. Patient informed of results, if available? Yes    Patient/family has been provided the following resources and education related to COVID-19:                         Signs, symptoms and red flags related to COVID-19            [facility] exposure and quarantine guidelines            [facility] exposure contact - [phone]                            Patient currently reports that the following symptoms have improved: patient stated feels \"very,very good\" and all of his S&S of covid are gone. Patient stated his BG today was 101. No further outreach scheduled with this CTN/ACM. Episode of Care resolved. Patient has this CTN/ACM contact information if future needs arise.

## 2021-05-06 ENCOUNTER — APPOINTMENT (OUTPATIENT)
Dept: GENERAL RADIOLOGY | Age: 61
End: 2021-05-06
Payer: MEDICARE

## 2021-05-06 ENCOUNTER — HOSPITAL ENCOUNTER (EMERGENCY)
Age: 61
Discharge: HOME | End: 2021-05-06
Payer: MEDICARE

## 2021-05-06 VITALS
BODY MASS INDEX: 27.13 KG/M2 | HEIGHT: 67 IN | SYSTOLIC BLOOD PRESSURE: 125 MMHG | HEART RATE: 74 BPM | TEMPERATURE: 97.2 F | DIASTOLIC BLOOD PRESSURE: 75 MMHG | OXYGEN SATURATION: 99 % | WEIGHT: 172.84 LBS | RESPIRATION RATE: 15 BRPM

## 2021-05-06 DIAGNOSIS — J06.9 UPPER RESPIRATORY TRACT INFECTION, UNSPECIFIED TYPE: ICD-10-CM

## 2021-05-06 DIAGNOSIS — M79.10 MUSCLE ACHE: Primary | ICD-10-CM

## 2021-05-06 PROCEDURE — 71046 X-RAY EXAM CHEST 2 VIEWS: CPT

## 2021-05-06 PROCEDURE — 99283 EMERGENCY DEPT VISIT LOW MDM: CPT

## 2021-05-06 RX ORDER — OXYMETAZOLINE HYDROCHLORIDE 0.05 G/100ML
2 SPRAY NASAL 2 TIMES DAILY
Qty: 1 BOTTLE | Refills: 0 | Status: SHIPPED | OUTPATIENT
Start: 2021-05-06 | End: 2021-06-05

## 2021-05-06 RX ORDER — ACETAMINOPHEN 500 MG
500 TABLET ORAL 4 TIMES DAILY PRN
Qty: 60 TABLET | Refills: 0 | Status: SHIPPED | OUTPATIENT
Start: 2021-05-06

## 2021-05-06 ASSESSMENT — PAIN DESCRIPTION - FREQUENCY: FREQUENCY: CONTINUOUS

## 2021-05-06 ASSESSMENT — PAIN DESCRIPTION - ORIENTATION: ORIENTATION: MID

## 2021-05-06 ASSESSMENT — PAIN SCALES - GENERAL: PAINLEVEL_OUTOF10: 7

## 2021-05-06 ASSESSMENT — PAIN DESCRIPTION - PAIN TYPE: TYPE: ACUTE PAIN

## 2021-05-06 ASSESSMENT — PAIN DESCRIPTION - PROGRESSION: CLINICAL_PROGRESSION: GRADUALLY WORSENING

## 2021-05-06 ASSESSMENT — PAIN DESCRIPTION - LOCATION: LOCATION: BACK

## 2021-05-06 NOTE — ED NOTES
Patient ambulated around ED with [facility]. Pt sp02 remained 95% or greater for the duration of walking per [facility]. [facility] [employer] notified.      Rosana Ramirez RN  05/06/21 6991

## 2021-05-06 NOTE — ED PROVIDER NOTES
[address]  [address] [address]  Dept: [phone]  Loc: [address] ENCOUNTER        This patient was not seen or evaluated by the attending physician. I evaluated this patient, the attending physician was available for consultation. CHIEF COMPLAINT    Chief Complaint   Patient presents with    Back Pain     pt c/o cough and back pain started yesterday. had first COVID shot on Monday        HPI    Ahmet Session is a 62 y.o. male who presents with cough, runny nose, muscle aches. Onset was last Thursday, with the backache starting yesterday. The duration has been constant since the onset. The context is a spontaneous onset. The pain severity is 7/10. There are no alleviating factors. Of note, he did get his first [address] vaccine on Monday. He has not tried taking anything to treat his symptoms. He does report that his grandson has been sick with an upper respiratory infection and believe it could be from that. The patient was hospitalized with Covid in February. He was found to have multiple subsegmental pulmonary emboli and has been on Eliquis since. He reports he has not missed taking any of his doses. He has no chest pain, SOB, difficulty breathing. He has no further complaints at this time.     REVIEW OF SYSTEMS    Pulmonary: No difficulty breathing or hemoptysis, with cough  General: No fevers or syncope  GI: No vomiting or diarrhea  ENT: see HPI, no sore throat  MSK: back ache, bilateral, with movement, no weakness/numbness/tingling    PAST MEDICAL AND SURGICAL HISTORY    Past Medical History:   Diagnosis Date    Gout     Hypertension 02/12/2018    Prediabetes 02/12/2018     Past Surgical History:   Procedure Laterality Date    SHOULDER SURGERY  2014       CURRENT MEDICATIONS  (may include discharge medications prescribed in the ED)  Current Outpatient Rx   Medication Sig Dispense Refill    oxymetazoline (12 HOUR NASAL SPRAY) 0.05 % nasal spray 2 sprays by Nasal route 2 times daily 1 Bottle 0    acetaminophen (TYLENOL) 500 MG tablet Take 1 tablet by mouth 4 times daily as needed for Pain 60 tablet 0    apixaban starter pack (ELIQUIS DVT/PE STARTER PACK) 5 MG TBPK tablet Take 1 tablet by mouth See Admin Instructions Pt has already completed first 4 days of the loading dose in the hospital. 60 each 5    glipiZIDE (GLUCOTROL) 10 MG tablet Take 1 tablet by mouth 2 times daily (before meals) 60 tablet 3    metFORMIN (GLUCOPHAGE) 500 MG tablet Take 1 tablet by mouth 2 times daily (with meals) 60 tablet 2    lisinopril (PRINIVIL;ZESTRIL) 10 MG tablet Take 1 tablet by mouth daily 30 tablet 1    Vitamin D (CHOLECALCIFEROL) 50 MCG (2000 UT) TABS tablet Take 1 tablet by mouth daily 60 tablet 0    Multiple Vitamin (MULTI VITAMIN) TABS Take 1 each by mouth daily 30 tablet 2    glucose monitoring kit (FREESTYLE) monitoring kit 1 kit by Does not apply route daily 1 kit 0    Lancets MISC 1 each by Does not apply route 2 times daily 300 each 1    blood glucose monitor strips Test 2 times a day & as needed for symptoms of irregular blood glucose. Dispense sufficient amount for indicated testing frequency plus additional to accommodate PRN testing needs. 100 strip 0    atorvastatin (LIPITOR) 20 MG tablet Take 1 tablet by mouth daily 30 tablet 2    Omega-3 Fatty Acids (OMEGA-3 FISH OIL) 1000 MG CAPS Take 1 capsule by mouth daily 90 capsule 2    allopurinol (ZYLOPRIM) 100 MG tablet Take 2 tablets by mouth daily 30 tablet 3       ALLERGIES    No Known Allergies    FAMILY AND SOCIAL HISTORY    History reviewed. No pertinent family history.   Social History     Socioeconomic History    Marital status: Single     Spouse name: None    Number of children: None    Years of education: None    Highest education level: None   Occupational History    None   Social Needs    Financial resource strain: None    Food insecurity     Worry: None     Inability: None    Transportation needs     Medical: None     Non-medical: None   Tobacco Use    Smoking status: Never Smoker    Smokeless tobacco: Never Used   Substance and Sexual Activity    Alcohol use: No    Drug use: No    Sexual activity: Yes     Partners: Female   Lifestyle    Physical activity     Days per week: None     Minutes per session: None    Stress: None   Relationships    Social connections     Talks on phone: None     Gets together: None     Attends [employer] service: None     Active member of club or organization: None     Attends meetings of clubs or organizations: None     Relationship status: None    Intimate partner violence     Fear of current or ex partner: None     Emotionally abused: None     Physically abused: None     Forced sexual activity: None   Other Topics Concern    None   Social History Narrative    None       PHYSICAL EXAM    VITAL SIGNS: /75   Pulse 74   Temp 97.2 °F (36.2 °C) (Temporal)   Resp 15   Ht 5' 7\" (1.702 m)   Wt 172 lb 13.5 oz (78.4 kg)   SpO2 99%   BMI 27.07 kg/m²   Constitutional:  Well developed, well nourished, no acute distress  Eyes: Sclera nonicteric, conjunctiva normal   Ears: bilateral ear canal appears nonerythematous and the ipsilateral TM appears grey and nonbulging, the mastoid and pinna are without redness or swelling   Throat/Face:  nonerythematous throat, no exudates, no trismus, no post-nasal drip  Neck: Supple, no enlarged tonsillar LAD  Respiratory:  Lungs clear to auscultation bilaterally, no retractions  Cardiovascular:  Regular rate, no murmurs, bilateral radial and PT pulses 2+, no edema  Musculoskeletal:  No edema, mild upper back tenderness bilaterally with movement  Neurologic: Awake alert and oriented, and no slurred speech  Integument:  Skin is warm and dry, no rash    RADIOLOGY/PROCEDURES    XR CHEST (2 VW)   Preliminary Result   No acute cardiopulmonary recognition program.  Every attempt was made to edit the dictations, but inevitably there remain words that are mis-transcribed.)        Luis Stevema  05/06/21 8055

## 2021-05-06 NOTE — ED NOTES
D/C: Order noted for d/c. Pt confirmed d/c paperwork does have correct name. Discharge and education instructions reviewed with patient. Teach-back successful. Pt verbalized understanding and signed d/c papers. Pt denied questions at this time. No acute distress noted. Patient instructed to follow-up as noted - return to emergency department if symptoms worsen. Patient verbalized understanding. Discharged per EDMD with discharge instructions. Pt discharged to private vehicle. Patient stable upon departure. Thanked patient for choosing [facility] for care. Provider aware of patient pain at time of discharge.      Tashi Gibbons RN  05/06/21 9040

## 2024-04-16 ENCOUNTER — OFFICE VISIT (OUTPATIENT)
Dept: FAMILY MEDICINE CLINIC | Age: 64
End: 2024-04-16
Payer: COMMERCIAL

## 2024-04-16 VITALS
HEART RATE: 52 BPM | OXYGEN SATURATION: 99 % | BODY MASS INDEX: 27.41 KG/M2 | WEIGHT: 175 LBS | TEMPERATURE: 97.8 F | DIASTOLIC BLOOD PRESSURE: 90 MMHG | SYSTOLIC BLOOD PRESSURE: 140 MMHG

## 2024-04-16 DIAGNOSIS — Z23 NEED FOR PNEUMOCOCCAL VACCINATION: ICD-10-CM

## 2024-04-16 DIAGNOSIS — Z11.9 SCREENING EXAMINATION FOR INFECTIOUS DISEASE: ICD-10-CM

## 2024-04-16 DIAGNOSIS — I10 ESSENTIAL HYPERTENSION: ICD-10-CM

## 2024-04-16 DIAGNOSIS — Z12.11 SCREEN FOR COLON CANCER: ICD-10-CM

## 2024-04-16 DIAGNOSIS — Z12.5 SCREENING FOR PROSTATE CANCER: ICD-10-CM

## 2024-04-16 DIAGNOSIS — E78.5 TYPE 2 DIABETES MELLITUS WITH HYPERLIPIDEMIA (HCC): Primary | ICD-10-CM

## 2024-04-16 DIAGNOSIS — E11.69 TYPE 2 DIABETES MELLITUS WITH HYPERLIPIDEMIA (HCC): Primary | ICD-10-CM

## 2024-04-16 PROCEDURE — 99204 OFFICE O/P NEW MOD 45 MIN: CPT | Performed by: FAMILY MEDICINE

## 2024-04-16 PROCEDURE — 3077F SYST BP >= 140 MM HG: CPT | Performed by: FAMILY MEDICINE

## 2024-04-16 PROCEDURE — 3080F DIAST BP >= 90 MM HG: CPT | Performed by: FAMILY MEDICINE

## 2024-04-16 PROCEDURE — 90677 PCV20 VACCINE IM: CPT | Performed by: FAMILY MEDICINE

## 2024-04-16 PROCEDURE — G0009 ADMIN PNEUMOCOCCAL VACCINE: HCPCS | Performed by: FAMILY MEDICINE

## 2024-04-16 RX ORDER — LOSARTAN POTASSIUM 25 MG/1
25 TABLET ORAL DAILY
Qty: 90 TABLET | Refills: 1 | Status: SHIPPED | OUTPATIENT
Start: 2024-04-16

## 2024-04-16 SDOH — ECONOMIC STABILITY: FOOD INSECURITY: WITHIN THE PAST 12 MONTHS, THE FOOD YOU BOUGHT JUST DIDN'T LAST AND YOU DIDN'T HAVE MONEY TO GET MORE.: NEVER TRUE

## 2024-04-16 SDOH — ECONOMIC STABILITY: HOUSING INSECURITY
IN THE LAST 12 MONTHS, WAS THERE A TIME WHEN YOU DID NOT HAVE A STEADY PLACE TO SLEEP OR SLEPT IN A SHELTER (INCLUDING NOW)?: NO

## 2024-04-16 SDOH — ECONOMIC STABILITY: FOOD INSECURITY: WITHIN THE PAST 12 MONTHS, YOU WORRIED THAT YOUR FOOD WOULD RUN OUT BEFORE YOU GOT MONEY TO BUY MORE.: NEVER TRUE

## 2024-04-16 SDOH — ECONOMIC STABILITY: INCOME INSECURITY: HOW HARD IS IT FOR YOU TO PAY FOR THE VERY BASICS LIKE FOOD, HOUSING, MEDICAL CARE, AND HEATING?: NOT HARD AT ALL

## 2024-04-16 ASSESSMENT — PATIENT HEALTH QUESTIONNAIRE - PHQ9
2. FEELING DOWN, DEPRESSED OR HOPELESS: NOT AT ALL
SUM OF ALL RESPONSES TO PHQ9 QUESTIONS 1 & 2: 0
SUM OF ALL RESPONSES TO PHQ QUESTIONS 1-9: 0
SUM OF ALL RESPONSES TO PHQ QUESTIONS 1-9: 0
1. LITTLE INTEREST OR PLEASURE IN DOING THINGS: NOT AT ALL
SUM OF ALL RESPONSES TO PHQ QUESTIONS 1-9: 0
SUM OF ALL RESPONSES TO PHQ QUESTIONS 1-9: 0

## 2024-04-16 NOTE — PROGRESS NOTES
Subjective:      Patient ID: Allen Zamora is a 64 y.o. male.    Here to become established     HPI  Treatment Adherence:   Medication compliance:  compliant all of the time  Diet compliance:  compliant all of the time  Weight trend: stable  Current exercise: no regular exercise  Barriers: none    Diabetes Mellitus Type 2: Current symptoms/problems include none.    Home blood sugar records: patient does not test  Any episodes of hypoglycemia? no  Eye exam current (within one year): yes  Tobacco history: He  reports that he has never smoked. He has never used smokeless tobacco.   Daily Aspirin? Yes    Hypertension:  Home blood pressure monitoring: No.  He is not adherent to a low sodium diet. Patient denies chest pain, shortness of breath, headache, lightheadedness, and peripheral edema.  Antihypertensive medication side effects: no medication side effects noted.  Use of agents associated with hypertension: none.     Hyperlipidemia:  No new myalgias or GI upset on atorvastatin (Lipitor).       Lab Results   Component Value Date    LABA1C 7.0 02/05/2021    LABA1C 6.5 02/26/2019    LABA1C 6.1 05/17/2018     Lab Results   Component Value Date    CREATININE 1.0 02/12/2021     Lab Results   Component Value Date    ALT 74 (H) 02/12/2021    AST 25 02/12/2021     Lab Results   Component Value Date    CHOL 308 (H) 02/26/2019    TRIG 296 (H) 02/26/2019    HDL 47 02/26/2019    LDLCALC 202 (H) 02/26/2019        Review of Systems    Objective:  Blood pressure (!) 140/90, pulse 52, temperature 97.8 °F (36.6 °C), weight 79.4 kg (175 lb), SpO2 99 %.     Physical Exam  Vitals and nursing note reviewed.   Constitutional:       General: He is not in acute distress.     Appearance: Normal appearance. He is well-developed. He is not ill-appearing, toxic-appearing or diaphoretic.   HENT:      Head: Normocephalic.   Eyes:      General: No scleral icterus.     Extraocular Movements: Extraocular movements intact.      Conjunctiva/sclera:

## 2024-04-19 LAB
CREAT UR-MCNC: 135.5 MG/DL (ref 39–259)
HCV AB SERPL QL IA: NORMAL
MICROALBUMIN UR DL<=1MG/L-MCNC: <1.2 MG/DL
MICROALBUMIN/CREAT UR: NORMAL MG/G (ref 0–30)
PSA SERPL DL<=0.01 NG/ML-MCNC: 1.09 NG/ML (ref 0–4)

## 2024-04-20 LAB
ALBUMIN SERPL-MCNC: 4.5 G/DL (ref 3.4–5)
ALBUMIN/GLOB SERPL: 1.7 {RATIO} (ref 1.1–2.2)
ALP SERPL-CCNC: 143 U/L (ref 40–129)
ALT SERPL-CCNC: 33 U/L (ref 10–40)
ANION GAP SERPL CALCULATED.3IONS-SCNC: 13 MMOL/L (ref 3–16)
AST SERPL-CCNC: 22 U/L (ref 15–37)
BILIRUB SERPL-MCNC: 0.3 MG/DL (ref 0–1)
BUN SERPL-MCNC: 19 MG/DL (ref 7–20)
CALCIUM SERPL-MCNC: 9.9 MG/DL (ref 8.3–10.6)
CHLORIDE SERPL-SCNC: 103 MMOL/L (ref 99–110)
CHOLEST SERPL-MCNC: 220 MG/DL (ref 0–199)
CO2 SERPL-SCNC: 24 MMOL/L (ref 21–32)
CREAT SERPL-MCNC: 1.3 MG/DL (ref 0.8–1.3)
EST. AVERAGE GLUCOSE BLD GHB EST-MCNC: 119.8 MG/DL
GFR SERPLBLD CREATININE-BSD FMLA CKD-EPI: 62 ML/MIN/{1.73_M2}
GLUCOSE SERPL-MCNC: 129 MG/DL (ref 70–99)
HBA1C MFR BLD: 5.8 %
HDLC SERPL-MCNC: 41 MG/DL (ref 40–60)
HIV 1+2 AB+HIV1 P24 AG SERPL QL IA: NORMAL
HIV 2 AB SERPL QL IA: NORMAL
HIV1 AB SERPL QL IA: NORMAL
HIV1 P24 AG SERPL QL IA: NORMAL
LDLC SERPL CALC-MCNC: 143 MG/DL
POTASSIUM SERPL-SCNC: 4.7 MMOL/L (ref 3.5–5.1)
PROT SERPL-MCNC: 7.1 G/DL (ref 6.4–8.2)
SODIUM SERPL-SCNC: 140 MMOL/L (ref 136–145)
TRIGL SERPL-MCNC: 182 MG/DL (ref 0–150)
VLDLC SERPL CALC-MCNC: 36 MG/DL

## 2024-07-24 ENCOUNTER — TELEPHONE (OUTPATIENT)
Dept: FAMILY MEDICINE CLINIC | Age: 64
End: 2024-07-24

## 2024-07-24 DIAGNOSIS — E78.49 OTHER HYPERLIPIDEMIA: ICD-10-CM

## 2024-07-24 RX ORDER — ATORVASTATIN CALCIUM 20 MG/1
20 TABLET, FILM COATED ORAL DAILY
Qty: 30 TABLET | Refills: 2 | Status: SHIPPED | OUTPATIENT
Start: 2024-07-24 | End: 2024-07-25

## 2024-07-24 NOTE — TELEPHONE ENCOUNTER
Medication:   Requested Prescriptions     Pending Prescriptions Disp Refills    atorvastatin (LIPITOR) 20 MG tablet [Pharmacy Med Name: ATORVASTATIN 20MG TABLETS] 90 tablet      Sig: TAKE 1 TABLET BY MOUTH DAILY        Last Filled:  07/24/2024    Patient Phone Number: [phone] (home)     Last appt: 4/16/2024   Next appt: 8/9/2024    Last OARRS:        No data to display

## 2024-07-24 NOTE — TELEPHONE ENCOUNTER
Medication:   Requested Prescriptions     Pending Prescriptions Disp Refills    atorvastatin (LIPITOR) 20 MG tablet [Pharmacy Med Name: ATORVASTATIN 20MG TABLETS] 90 tablet      Sig: TAKE 1 TABLET BY MOUTH DAILY        Last Filled:      Patient Phone Number: [phone] (home)     Last appt: 4/16/2024   Next appt: 8/9/2024    Last OARRS:        No data to display

## 2024-07-25 RX ORDER — ATORVASTATIN CALCIUM 20 MG/1
20 TABLET, FILM COATED ORAL DAILY
Qty: 90 TABLET | Refills: 3 | Status: SHIPPED | OUTPATIENT
Start: 2024-07-25

## 2024-08-09 ENCOUNTER — OFFICE VISIT (OUTPATIENT)
Dept: FAMILY MEDICINE CLINIC | Age: 64
End: 2024-08-09
Payer: COMMERCIAL

## 2024-08-09 VITALS
TEMPERATURE: 98.3 F | OXYGEN SATURATION: 98 % | HEART RATE: 78 BPM | SYSTOLIC BLOOD PRESSURE: 160 MMHG | BODY MASS INDEX: 26.37 KG/M2 | WEIGHT: 168 LBS | DIASTOLIC BLOOD PRESSURE: 78 MMHG | HEIGHT: 67 IN

## 2024-08-09 DIAGNOSIS — E78.49 OTHER HYPERLIPIDEMIA: ICD-10-CM

## 2024-08-09 DIAGNOSIS — E78.5 TYPE 2 DIABETES MELLITUS WITH HYPERLIPIDEMIA (HCC): Primary | ICD-10-CM

## 2024-08-09 DIAGNOSIS — E11.69 TYPE 2 DIABETES MELLITUS WITH HYPERLIPIDEMIA (HCC): Primary | ICD-10-CM

## 2024-08-09 DIAGNOSIS — I10 ESSENTIAL HYPERTENSION: ICD-10-CM

## 2024-08-09 PROCEDURE — 99214 OFFICE O/P EST MOD 30 MIN: CPT | Performed by: FAMILY MEDICINE

## 2024-08-09 PROCEDURE — 3078F DIAST BP <80 MM HG: CPT | Performed by: FAMILY MEDICINE

## 2024-08-09 PROCEDURE — 3077F SYST BP >= 140 MM HG: CPT | Performed by: FAMILY MEDICINE

## 2024-08-09 PROCEDURE — 3044F HG A1C LEVEL LT 7.0%: CPT | Performed by: FAMILY MEDICINE

## 2024-08-09 RX ORDER — LOSARTAN POTASSIUM AND HYDROCHLOROTHIAZIDE 12.5; 5 MG/1; MG/1
1 TABLET ORAL DAILY
Qty: 90 TABLET | Refills: 1 | Status: SHIPPED | OUTPATIENT
Start: 2024-08-09

## 2024-08-09 NOTE — PROGRESS NOTES
Allen Zamora (:  1960) is a 64 y.o. male,Established patient, here for evaluation of the following chief complaint(s):  Follow-up (3 mos follow up. No new issues or concerns. //Look at ears due to hearing buzzing/ humming sound that has been going on for some time. Stated that it has been bothering him. )      Assessment & Plan      Diagnosis Orders   1. Type 2 diabetes mellitus with hyperlipidemia (HCC)   Check fu labs  Continue Metformin   Encourage compliance with medication, life style changes   Hemoglobin A1C    Basic Metabolic Panel    metFORMIN (GLUCOPHAGE) 500 MG tablet      2. Essential hypertension   Uncontrolled,   Increase Losartan to 50/25 mg  Fu in 1 mo   Check labs  losartan-hydroCHLOROthiazide (HYZAAR) 50-12.5 MG per tablet    CBC with Auto Differential    TSH with Reflex      3. Other hyperlipidemia   Check lipids   Continue Lipitor      Lipid Panel            Fu 1 mo        Subjective   HPI  Treatment Adherence:   Medication compliance:  compliant all of the time  Diet compliance:  compliant most of the time  Weight trend: stable  Current exercise: walks 5 time(s) per week  Barriers: none    Diabetes Mellitus Type 2: Current symptoms/problems include none.    Home blood sugar records: patient does not test  Any episodes of hypoglycemia? no  Eye exam current (within one year): yes  Tobacco history: He  reports that he has never smoked. He has never used smokeless tobacco.   Daily Aspirin? Yes    Hypertension:  Home blood pressure monitoring: No.  He is not adherent to a low sodium diet. Patient denies chest pain, shortness of breath, lightheadedness, peripheral edema, palpitations, dry cough, and fatigue.  Antihypertensive medication side effects: no medication side effects noted.  Use of agents associated with hypertension: none.     Hyperlipidemia:  No new myalgias or GI upset on atorvastatin (Lipitor).       Tinnitus bilateral:   For month   No hearing loss and no vertigo     Lab

## 2024-09-25 LAB — NONINV COLON CA DNA+OCC BLD SCRN STL QL: NEGATIVE

## 2024-09-30 ENCOUNTER — TELEPHONE (OUTPATIENT)
Dept: FAMILY MEDICINE CLINIC | Age: 64
End: 2024-09-30

## 2024-09-30 NOTE — TELEPHONE ENCOUNTER
----- Message from Dr. Jessica Mari MD sent at 9/25/2024 12:59 PM EDT -----  Cologuard: normal   Next in 3 years

## 2024-09-30 NOTE — TELEPHONE ENCOUNTER
MA place call to [phone] (home)  lm informing patient of  [phone] (home)   message stated below. Advised that if they have any other questions or concerns can call our office back at [phone] at their convenience.

## 2025-02-04 DIAGNOSIS — I10 ESSENTIAL HYPERTENSION: ICD-10-CM

## 2025-02-04 NOTE — TELEPHONE ENCOUNTER
Medication:   Requested Prescriptions     Pending Prescriptions Disp Refills    losartan-hydroCHLOROthiazide (HYZAAR) 50-12.5 MG per tablet [Pharmacy Med Name: LOSARTAN/HCTZ 50/12.5MG TABLETS] 90 tablet 1     Sig: TAKE 1 TABLET BY MOUTH DAILY        Last Filled: 08/09/2024      Patient Phone Number: [phone] (home)     Last appt: 8/9/2024   Next appt: Visit date not found    Last OARRS:        No data to display                   61 y/o female with PMHx of HTN, HLD, DM, and anxiety presents to the ED with c/o feelings of anxiety and sadness. Pt states that her 29 y/o son committed suicide yesterday and she has been grieving at home. Pt has 2 other children with Asperger's and another child with developmental delay. Pt states that she has been feeling stressed and weak. Pt is . Pt is currently here with her brother. Pt denies any suicidal ideation, homicidal ideation, or other complaints.

## 2025-02-05 RX ORDER — LOSARTAN POTASSIUM AND HYDROCHLOROTHIAZIDE 12.5; 5 MG/1; MG/1
1 TABLET ORAL DAILY
Qty: 90 TABLET | Refills: 1 | Status: SHIPPED | OUTPATIENT
Start: 2025-02-05